# Patient Record
Sex: FEMALE | Race: WHITE | Employment: STUDENT | URBAN - METROPOLITAN AREA
[De-identification: names, ages, dates, MRNs, and addresses within clinical notes are randomized per-mention and may not be internally consistent; named-entity substitution may affect disease eponyms.]

---
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
## 2018-01-17 ENCOUNTER — MYC MEDICAL ADVICE (OUTPATIENT)
Dept: FAMILY MEDICINE | Facility: CLINIC | Age: 18
End: 2018-01-17

## 2019-01-30 ENCOUNTER — MYC MEDICAL ADVICE (OUTPATIENT)
Dept: FAMILY MEDICINE | Facility: CLINIC | Age: 19
End: 2019-01-30

## 2019-01-30 DIAGNOSIS — Z11.3 SCREEN FOR STD (SEXUALLY TRANSMITTED DISEASE): Primary | ICD-10-CM

## 2019-02-01 DIAGNOSIS — Z11.3 SCREEN FOR STD (SEXUALLY TRANSMITTED DISEASE): ICD-10-CM

## 2019-02-01 PROCEDURE — 87389 HIV-1 AG W/HIV-1&-2 AB AG IA: CPT | Performed by: FAMILY MEDICINE

## 2019-02-01 PROCEDURE — 87491 CHLMYD TRACH DNA AMP PROBE: CPT | Performed by: FAMILY MEDICINE

## 2019-02-01 PROCEDURE — 87591 N.GONORRHOEAE DNA AMP PROB: CPT | Performed by: FAMILY MEDICINE

## 2019-02-01 PROCEDURE — 86780 TREPONEMA PALLIDUM: CPT | Performed by: FAMILY MEDICINE

## 2019-02-01 PROCEDURE — 36415 COLL VENOUS BLD VENIPUNCTURE: CPT | Performed by: FAMILY MEDICINE

## 2019-02-02 LAB — T PALLIDUM AB SER QL: NONREACTIVE

## 2019-02-03 ENCOUNTER — TELEPHONE (OUTPATIENT)
Dept: FAMILY MEDICINE | Facility: CLINIC | Age: 19
End: 2019-02-03

## 2019-02-03 LAB
C TRACH DNA SPEC QL NAA+PROBE: POSITIVE
N GONORRHOEA DNA SPEC QL NAA+PROBE: NEGATIVE
SPECIMEN SOURCE: ABNORMAL
SPECIMEN SOURCE: NORMAL

## 2019-02-04 ENCOUNTER — TELEPHONE (OUTPATIENT)
Dept: FAMILY MEDICINE | Facility: CLINIC | Age: 19
End: 2019-02-04

## 2019-02-04 ENCOUNTER — MYC MEDICAL ADVICE (OUTPATIENT)
Dept: FAMILY MEDICINE | Facility: CLINIC | Age: 19
End: 2019-02-04

## 2019-02-04 DIAGNOSIS — Z11.8 SPECIAL SCREENING EXAMINATION FOR CHLAMYDIAL DISEASE: Primary | ICD-10-CM

## 2019-02-04 DIAGNOSIS — A74.9 CHLAMYDIA: Primary | ICD-10-CM

## 2019-02-04 LAB — HIV 1+2 AB+HIV1 P24 AG SERPL QL IA: NONREACTIVE

## 2019-02-04 RX ORDER — AZITHROMYCIN 500 MG/1
1000 TABLET, FILM COATED ORAL DAILY
Qty: 2 TABLET | Refills: 0 | Status: SHIPPED | OUTPATIENT
Start: 2019-02-04 | End: 2019-02-05

## 2019-02-04 NOTE — TELEPHONE ENCOUNTER
Informed pt of positive Chlamydia results. Per Dr Carmichael I called in rx for Azithromycin 1 gram 1 dose. To [facility] on [address].  Pt will inform partner and abstain for 1 week  marti longoria ma

## 2019-02-04 NOTE — TELEPHONE ENCOUNTER
Phone call from patient     She was advised by urgent care on 2/3/19 that her chlamydia test was positive and an rx would be sent to the [facility] for her     Patient states that the pharmacy called urgent care back to discuss rx and they received someone who advised patient was NOT in clinic and there was no record of the STD testing     Patient advised that her primary care provided placed the orders as lab only - writer does see these results in chart     Prescription sent to patient's pharmacy based on TE 2/3/19 Dr. Jany Reardon, Registered Nurse   [facility]

## 2019-02-06 ENCOUNTER — TELEPHONE (OUTPATIENT)
Dept: FAMILY MEDICINE | Facility: CLINIC | Age: 19
End: 2019-02-06

## 2019-02-06 NOTE — TELEPHONE ENCOUNTER
----- Message from Lucia Cortes sent at 2/5/2019 11:38 AM CST -----  Regarding: [facility] Reportables  Positive for C. Trachomatis

## 2019-03-04 DIAGNOSIS — Z11.8 SPECIAL SCREENING EXAMINATION FOR CHLAMYDIAL DISEASE: ICD-10-CM

## 2019-03-04 PROCEDURE — 87491 CHLMYD TRACH DNA AMP PROBE: CPT | Performed by: FAMILY MEDICINE

## 2019-03-05 LAB
C TRACH DNA SPEC QL NAA+PROBE: NEGATIVE
SPECIMEN SOURCE: NORMAL

## 2019-08-16 ENCOUNTER — OFFICE VISIT (OUTPATIENT)
Dept: FAMILY MEDICINE | Facility: CLINIC | Age: 19
End: 2019-08-16
Payer: COMMERCIAL

## 2019-08-16 VITALS
HEART RATE: 52 BPM | TEMPERATURE: 96.9 F | WEIGHT: 135.8 LBS | SYSTOLIC BLOOD PRESSURE: 90 MMHG | RESPIRATION RATE: 16 BRPM | BODY MASS INDEX: 25.24 KG/M2 | DIASTOLIC BLOOD PRESSURE: 60 MMHG

## 2019-08-16 PROCEDURE — 99213 OFFICE O/P EST LOW 20 MIN: CPT | Performed by: FAMILY MEDICINE

## 2019-08-16 RX ORDER — NORGESTIMATE AND ETHINYL ESTRADIOL 7DAYSX3 28
1 KIT ORAL DAILY
Qty: 84 TABLET | Refills: 3 | Status: SHIPPED | OUTPATIENT
Start: 2019-08-16 | End: 2019-11-25 | Stop reason: SINTOL

## 2019-08-16 ASSESSMENT — PAIN SCALES - GENERAL: PAINLEVEL: NO PAIN (0)

## 2019-08-16 NOTE — PROGRESS NOTES
Subjective     Blanca Andrew is a 18 year old female who presents to clinic today for the following health issues:    HPI     Chief Complaint   Patient presents with     Contraception     Needs to discuss birth control options. She is sexually active and using condoms for contraception currently. Took Sprintec in the past and did not like it. Had heavy painful periods while taking it. Periods without birth control are light and very manageable for her. Wondering about options      Reviewed and updated as needed this visit by Provider  Tobacco  Meds  Med Hx  Surg Hx  Fam Hx  Soc Hx        Review of Systems   ROS COMP: Constitutional, HEENT, cardiovascular, pulmonary, gi and gu systems are negative, except as otherwise noted.      Objective    BP 90/60 (BP Location: Right arm, Patient Position: Sitting, Cuff Size: Adult Regular)   Pulse 52   Temp 96.9  F (36.1  C) (Tympanic)   Resp 16   Wt 61.6 kg (135 lb 12.8 oz)   LMP 07/27/2019   BMI 25.24 kg/m    Body mass index is 25.24 kg/m .  Physical Exam   PE:  VS as above   Gen:  WN/WD/WH female in NAD  Remainder of exam deferred    Diagnostic Test Results:  Labs reviewed in Epic        A/P:      ICD-10-CM    1. Contraception Z78.9 norgestim-eth estrad triphasic (ORTHO TRI-CYCLEN/TRI-SPRINTEC) 0.18/0.215/0.25 MG-35 MCG tablet     Reviewed variety of contraception options including LTRC such as Nexplanon and IUD.  Also reviewed OCP including risk of blood clot.    Pt opted for OCP as prescribed.    Reviewed when to start med Sunday after the firs day of her menstrual period.  Reviewed need for condoms for at least the first month of use and ongoing for STD prevention.  Reviewed side effects, risks and reasons to seek care.    15 minutes of 15 min visit spent reviewing various options as above.

## 2019-11-21 ENCOUNTER — MYC MEDICAL ADVICE (OUTPATIENT)
Dept: FAMILY MEDICINE | Facility: CLINIC | Age: 19
End: 2019-11-21

## 2019-11-21 DIAGNOSIS — Z30.011 ENCOUNTER FOR INITIAL PRESCRIPTION OF CONTRACEPTIVE PILLS: Primary | ICD-10-CM

## 2019-11-26 ENCOUNTER — MYC MEDICAL ADVICE (OUTPATIENT)
Dept: FAMILY MEDICINE | Facility: CLINIC | Age: 19
End: 2019-11-26

## 2019-11-26 RX ORDER — NORETHINDRONE ACETATE AND ETHINYL ESTRADIOL .02; 1 MG/1; MG/1
1 TABLET ORAL DAILY
Qty: 84 TABLET | Refills: 3 | Status: SHIPPED | OUTPATIENT
Start: 2019-11-26 | End: 2020-02-18

## 2019-11-29 ENCOUNTER — MYC MEDICAL ADVICE (OUTPATIENT)
Dept: FAMILY MEDICINE | Facility: CLINIC | Age: 19
End: 2019-11-29

## 2020-01-21 ENCOUNTER — OFFICE VISIT (OUTPATIENT)
Dept: FAMILY MEDICINE | Facility: CLINIC | Age: 20
End: 2020-01-21
Payer: COMMERCIAL

## 2020-01-21 VITALS
SYSTOLIC BLOOD PRESSURE: 120 MMHG | DIASTOLIC BLOOD PRESSURE: 70 MMHG | HEIGHT: 62 IN | BODY MASS INDEX: 25.43 KG/M2 | TEMPERATURE: 97.3 F | WEIGHT: 138.2 LBS | HEART RATE: 64 BPM | RESPIRATION RATE: 12 BRPM

## 2020-01-21 DIAGNOSIS — F33.1 MODERATE EPISODE OF RECURRENT MAJOR DEPRESSIVE DISORDER (H): Primary | ICD-10-CM

## 2020-01-21 PROCEDURE — 99213 OFFICE O/P EST LOW 20 MIN: CPT | Performed by: FAMILY MEDICINE

## 2020-01-21 RX ORDER — ESCITALOPRAM OXALATE 10 MG/1
10 TABLET ORAL DAILY
Qty: 30 TABLET | Refills: 1 | Status: SHIPPED | OUTPATIENT
Start: 2020-01-21 | End: 2020-02-18

## 2020-01-21 ASSESSMENT — ANXIETY QUESTIONNAIRES
2. NOT BEING ABLE TO STOP OR CONTROL WORRYING: MORE THAN HALF THE DAYS
1. FEELING NERVOUS, ANXIOUS, OR ON EDGE: SEVERAL DAYS
5. BEING SO RESTLESS THAT IT IS HARD TO SIT STILL: NOT AT ALL
3. WORRYING TOO MUCH ABOUT DIFFERENT THINGS: MORE THAN HALF THE DAYS
GAD7 TOTAL SCORE: 11
6. BECOMING EASILY ANNOYED OR IRRITABLE: NEARLY EVERY DAY
7. FEELING AFRAID AS IF SOMETHING AWFUL MIGHT HAPPEN: MORE THAN HALF THE DAYS

## 2020-01-21 ASSESSMENT — PATIENT HEALTH QUESTIONNAIRE - PHQ9
5. POOR APPETITE OR OVEREATING: SEVERAL DAYS
SUM OF ALL RESPONSES TO PHQ QUESTIONS 1-9: 16

## 2020-01-21 ASSESSMENT — MIFFLIN-ST. JEOR: SCORE: 1349.09

## 2020-01-21 NOTE — PROGRESS NOTES
Subjective     Blanca Andrew is a 19 year old female who presents to clinic today for the following health issues: Return of depression Sx.     HPI   Blanca Andrew presents to clinic today with complaints of return of depression symptoms for last 3-4 months. She states that she was dx with depression 2 years ago while attending [employer] in [place]. Was seen by counseling services on campus, and prescribed Sertraline and Trazodone at that time. Comments that counseling services were helpful, but stopped meds after 3 months due to GI upset and lack of efficacy.  Continued counseling x1 year until feeling well before stopping.     Blanca, graduated from [employer] in December 2019, and states that her parents  5 months ago, she states that both of these are big stressors in her life and likely are contributing to her return of depression symptoms. She comments that her feelings of depression began coinciding with these stressors and have gradually worsened over this time.     She denies SI, endorses having intrusive thoughts on two occassions while driving her car but no desire to act upon, and states that she has no plans to hurt herself or others. She states she feels close to her parents and can talk to either her mom, dad, or boyfriend if her symptoms/thoughts were to worsen. Is currently taking pre-med classes at local [employer] and works as a scribe at [employer]. States she wants to enjoy activities and being with friends but wakes up in the morning with feelings of being overwhelmed, sadness, and little enjoyment for the day.  Cannot enjoy activities at this time despite wanting too. States she recently start counseling services again, has had one session to date and has another scheduled today.       Abnormal Mood Symptoms  Onset: 2 months    Description:   Depression: YES  Anxiety: YES    Accompanying Signs & Symptoms:  Still participating in activities that you used to enjoy:  "no  Fatigue: YES  Irritability: YES  Difficulty concentrating: no  Changes in appetite: YES  Problems with sleep: YES  Heart racing/beating fast : no  Thoughts of hurting yourself or others: none    History:   Recent stress: YES- graduated college 3 weeks ago, living at home with parents, working a lot, looking for new job, considering moving in with boyfriend, applying to med school  Prior depression hospitalization: None  Family history of depression: no  Family history of anxiety: YES- mother    Precipitating factors:   Alcohol/drug use: YES- very occasional alcohol    Alleviating factors:  none    Review of Systems   ROS COMP: Constitutional, HEENT, cardiovascular, pulmonary, gi and gu systems are negative, except as otherwise noted.      Objective    /70   Pulse 64   Temp 97.3  F (36.3  C) (Tympanic)   Resp 12   Ht 1.581 m (5' 2.25\")   Wt 62.7 kg (138 lb 3.2 oz)   LMP 01/07/2020   Breastfeeding No   BMI 25.07 kg/m    Body mass index is 25.07 kg/m .     Physical Exam   PE:  VS as above   Gen:  WN/WD/WH female in NAD   Psych: Alert and oriented times 3; coherent speech, normal   rate and volume, able to articulate logical thoughts, able   to abstract reason, no tangential thoughts, no hallucinations   or delusions  Her affect is depressed and tearful.  Good eye contact noted      Diagnostic Test Results:  none         Assessment & Plan   Assessment    ICD-10-CM    1. Moderate episode of recurrent major depressive disorder (H) F33.1 escitalopram (LEXAPRO) 10 MG tablet     -Symptoms are consistent with clinical depression, given hx and recent stressors, appropriate to begin therapy again.     -Blanca was provided education on all side effects of serotonin specific reuptake inhibitor's, including: GI, sexual side effects, and possibility of increase in SI and recommended to call clinic immediately if symptoms are noticed.       Plan  -Start Escitalopram 10 mg daily.  -Continue counseling services. "   -Patient plans to return in about 4 weeks (around 2/18/2020) for Physical Exam and follow up on medication efficacy.     Patient Instructions   So glad to hear that you have already gotten started with therapy!    We'll begin a medicine today too, lexapro.  This is taken just once daily.  Please let me know if you have any concerning side effects.  You can reach out through [employer] if needed.    We should visit again in 4-6 weeks to see how things are going.  We can do that at the same time as your physical.        Evangelina Alvarado, DO  [facility]

## 2020-01-21 NOTE — NURSING NOTE
"Initial /70   Pulse 64   Temp 97.3  F (36.3  C) (Tympanic)   Resp 12   Ht 1.581 m (5' 2.25\")   Wt 62.7 kg (138 lb 3.2 oz)   LMP 01/07/2020   Breastfeeding No   BMI 25.07 kg/m   Estimated body mass index is 25.07 kg/m  as calculated from the following:    Height as of this encounter: 1.581 m (5' 2.25\").    Weight as of this encounter: 62.7 kg (138 lb 3.2 oz). .      "

## 2020-01-21 NOTE — PATIENT INSTRUCTIONS
So glad to hear that you have already gotten started with therapy!    We'll begin a medicine today too, lexapro.  This is taken just once daily.  Please let me know if you have any concerning side effects.  You can reach out through [employer] if needed.    We should visit again in 4-6 weeks to see how things are going.  We can do that at the same time as your physical.

## 2020-01-21 NOTE — LETTER
My Depression Action Plan  Name: Blanca Andrew   Date of Birth 2000  Date: 1/21/2020    My doctor: Evangelina Alvarado   My clinic: [address] [id number] [phone]          GREEN    ZONE   Good Control    What it looks like:     Things are going generally well. You have normal ups and downs. You may even feel depressed from time to time, but bad moods usually last less than a day.   What you need to do:  1. Continue to care for yourself (see self care plan)  2. Check your depression survival kit and update it as needed  3. Follow your physician s recommendations including any medication.  4. Do not stop taking medication unless you consult with your physician first.           YELLOW         ZONE Getting Worse    What it looks like:     Depression is starting to interfere with your life.     It may be hard to get out of bed; you may be starting to isolate yourself from others.    Symptoms of depression are starting to last most all day and this has happened for several days.     You may have suicidal thoughts but they are not constant.   What you need to do:     1. Call your care team. Your response to treatment will improve if you keep your care team informed of your progress. Yellow periods are signs an adjustment may need to be made.     2. Continue your self-care.  Just get dressed and ready for the day.  Don't give yourself time to talk yourself out of it.    3. Talk to someone in your support network.    4. Open up your Depression Self-Care Plan/Wellness Kit.           RED    ZONE Medical Alert - Get Help    What it looks like:     Depression is seriously interfering with your life.     You may experience these or other symptoms: You can t get out of bed most days, can t work or engage in other necessary activities, you have trouble taking care of basic hygiene, or basic responsibilities, thoughts of suicide or death that will not go away,  self-injurious behavior.     What you need to do:  1. Call your care team and request a same-day appointment. If they are not available (weekends or after hours) call your local crisis line, emergency room or 911.            Depression Self-Care Plan / Wellness Kit    Self-Care for Depression  Here s the deal. Your body and mind are really not as separate as most people think.  What you do and think affects how you feel and how you feel influences what you do and think. This means if you do things that people who feel good do, it will help you feel better.  Sometimes this is all it takes.  There is also a place for medication and therapy depending on how severe your depression is, so be sure to consult with your medical provider and/ or Behavioral Health Consultant if your symptoms are worsening or not improving.     In order to better manage my stress, I will:    Exercise  Get some form of exercise, every day. This will help reduce pain and release endorphins, the  feel good  chemicals in your brain. This is almost as good as taking antidepressants!  This is not the same as joining a gym and then never going! (they count on that by the way ) It can be as simple as just going for a walk or doing some gardening, anything that will get you moving.      Hygiene   Maintain good hygiene (get out of bed in the morning, make your bed, brush your teeth, take a shower, and get dressed like you were going to work, even if you are unemployed).  If your clothes don't fit try to get ones that do.    Diet  Strive to eat foods that are good for me, drink plenty of water, and avoid excessive sugar, caffeine, alcohol, and other mood-altering substances.  Some foods that are helpful in depression are: complex carbohydrates, B vitamins, flaxseed, fish or fish oil, fresh fruits and vegetables.    Psychotherapy  Agree to participate in Individual Therapy (if recommended).    Medication  If prescribed medications, I agree to take them.   Missing doses can result in serious side effects.  I understand that drinking alcohol, or other illicit drug use, may cause potential side effects.  I will not stop my medication abruptly without first discussing it with my provider.    Staying Connected With Others  Stay in touch with my friends, family members, and my primary care provider/team.    Use your imagination  Be creative.  We all have a creative side; it doesn t matter if it s oil painting, sand castles, or mud pies! This will also kick up the endorphins.    Witness Beauty  (AKA stop and smell the roses) Take a look outside, even in mid-winter. Notice colors, textures. Watch the squirrels and birds.     Service to others  Be of service to others.  There is always someone else in need.  By helping others we can  get out of ourselves  and remember the really important things.  This also provides opportunities for practicing all the other parts of the program.    Humor  Laugh and be silly!  Adjust your TV habits for less news and crime-drama and more comedy.    Control your stress  Try breathing deep, massage therapy, biofeedback, and meditation. Find time to relax each day.     Crisis Text Line  http://www.crisistextline.org    The Crisis Text Line serves anyone, in any type of crisis, providing access to free, 24/7 support and information via the medium people already use and trust:    Here's how it works:  1.  Text 348-383 from anywhere in the [place], anytime, about any type of crisis.  2.  A live, trained Crisis Counselor receives the text and responds quickly.  3.  The volunteer Crisis Counselor will help you move from a 'hot moment to a cool moment'.    My support system    Clinic Contact:  Phone number:    Contact 1:  Phone number:    Contact 2:  Phone number:    P[employer]/:  Phone number:    Therapist:  Phone number:    Local crisis center:    Phone number:    Other community support:  Phone number:

## 2020-01-22 ASSESSMENT — ANXIETY QUESTIONNAIRES: GAD7 TOTAL SCORE: 11

## 2020-02-15 ENCOUNTER — MYC REFILL (OUTPATIENT)
Dept: FAMILY MEDICINE | Facility: CLINIC | Age: 20
End: 2020-02-15

## 2020-02-15 DIAGNOSIS — Z30.011 ENCOUNTER FOR INITIAL PRESCRIPTION OF CONTRACEPTIVE PILLS: ICD-10-CM

## 2020-02-15 DIAGNOSIS — F33.1 MODERATE EPISODE OF RECURRENT MAJOR DEPRESSIVE DISORDER (H): ICD-10-CM

## 2020-02-15 RX ORDER — ESCITALOPRAM OXALATE 10 MG/1
10 TABLET ORAL DAILY
Qty: 30 TABLET | Refills: 1 | Status: CANCELLED | OUTPATIENT
Start: 2020-02-15

## 2020-02-15 RX ORDER — NORETHINDRONE ACETATE AND ETHINYL ESTRADIOL .02; 1 MG/1; MG/1
1 TABLET ORAL DAILY
Qty: 84 TABLET | Refills: 3 | Status: CANCELLED | OUTPATIENT
Start: 2020-02-15

## 2020-02-16 ENCOUNTER — HEALTH MAINTENANCE LETTER (OUTPATIENT)
Age: 20
End: 2020-02-16

## 2020-02-18 ENCOUNTER — MYC REFILL (OUTPATIENT)
Dept: FAMILY MEDICINE | Facility: CLINIC | Age: 20
End: 2020-02-18

## 2020-02-18 ENCOUNTER — MYC MEDICAL ADVICE (OUTPATIENT)
Dept: FAMILY MEDICINE | Facility: CLINIC | Age: 20
End: 2020-02-18

## 2020-02-18 DIAGNOSIS — F33.1 MODERATE EPISODE OF RECURRENT MAJOR DEPRESSIVE DISORDER (H): ICD-10-CM

## 2020-02-18 DIAGNOSIS — Z30.011 ENCOUNTER FOR INITIAL PRESCRIPTION OF CONTRACEPTIVE PILLS: ICD-10-CM

## 2020-02-18 RX ORDER — NORETHINDRONE ACETATE AND ETHINYL ESTRADIOL .02; 1 MG/1; MG/1
1 TABLET ORAL DAILY
Qty: 84 TABLET | Refills: 0 | Status: SHIPPED | OUTPATIENT
Start: 2020-02-18 | End: 2020-02-25

## 2020-02-18 RX ORDER — ESCITALOPRAM OXALATE 10 MG/1
10 TABLET ORAL DAILY
Qty: 30 TABLET | Refills: 0 | Status: SHIPPED | OUTPATIENT
Start: 2020-02-18 | End: 2020-04-24

## 2020-02-18 NOTE — TELEPHONE ENCOUNTER
Prescription approved per [facility] [place] Refill Protocol.  Patient needs follow up for depression, has appointment scheduled 2/25/20.  Cherry Paula RN

## 2020-02-24 ASSESSMENT — ENCOUNTER SYMPTOMS
MYALGIAS: 0
HEADACHES: 1
DYSURIA: 0
FREQUENCY: 0
HEMATOCHEZIA: 0
ABDOMINAL PAIN: 0
FEVER: 0
CHILLS: 0
PALPITATIONS: 0
WEAKNESS: 0
HEARTBURN: 0
DIZZINESS: 0
NAUSEA: 0
SORE THROAT: 0
EYE PAIN: 0
COUGH: 0
JOINT SWELLING: 0
ARTHRALGIAS: 0
CONSTIPATION: 0
HEMATURIA: 0
PARESTHESIAS: 0
BREAST MASS: 0
SHORTNESS OF BREATH: 0
DIARRHEA: 0
NERVOUS/ANXIOUS: 1

## 2020-02-25 ENCOUNTER — OFFICE VISIT (OUTPATIENT)
Dept: FAMILY MEDICINE | Facility: CLINIC | Age: 20
End: 2020-02-25
Payer: COMMERCIAL

## 2020-02-25 VITALS
DIASTOLIC BLOOD PRESSURE: 76 MMHG | SYSTOLIC BLOOD PRESSURE: 124 MMHG | RESPIRATION RATE: 12 BRPM | HEART RATE: 60 BPM | TEMPERATURE: 97 F | BODY MASS INDEX: 25.58 KG/M2 | HEIGHT: 62 IN | WEIGHT: 139 LBS

## 2020-02-25 DIAGNOSIS — F33.1 MODERATE EPISODE OF RECURRENT MAJOR DEPRESSIVE DISORDER (H): ICD-10-CM

## 2020-02-25 DIAGNOSIS — Z12.4 SCREENING FOR CERVICAL CANCER: ICD-10-CM

## 2020-02-25 DIAGNOSIS — Z00.00 ROUTINE GENERAL MEDICAL EXAMINATION AT A HEALTH CARE FACILITY: Primary | ICD-10-CM

## 2020-02-25 DIAGNOSIS — Z11.3 SCREEN FOR STD (SEXUALLY TRANSMITTED DISEASE): ICD-10-CM

## 2020-02-25 DIAGNOSIS — Z30.011 ENCOUNTER FOR INITIAL PRESCRIPTION OF CONTRACEPTIVE PILLS: ICD-10-CM

## 2020-02-25 PROCEDURE — 87591 N.GONORRHOEAE DNA AMP PROB: CPT | Performed by: FAMILY MEDICINE

## 2020-02-25 PROCEDURE — G0145 SCR C/V CYTO,THINLAYER,RESCR: HCPCS | Performed by: FAMILY MEDICINE

## 2020-02-25 PROCEDURE — 87491 CHLMYD TRACH DNA AMP PROBE: CPT | Performed by: FAMILY MEDICINE

## 2020-02-25 PROCEDURE — 99395 PREV VISIT EST AGE 18-39: CPT | Performed by: FAMILY MEDICINE

## 2020-02-25 PROCEDURE — 99213 OFFICE O/P EST LOW 20 MIN: CPT | Mod: 25 | Performed by: FAMILY MEDICINE

## 2020-02-25 RX ORDER — ESCITALOPRAM OXALATE 10 MG/1
10 TABLET ORAL DAILY
Qty: 30 TABLET | Refills: 0 | Status: CANCELLED | OUTPATIENT
Start: 2020-02-25

## 2020-02-25 RX ORDER — ESCITALOPRAM OXALATE 20 MG/1
20 TABLET ORAL DAILY
Qty: 30 TABLET | Refills: 0 | Status: SHIPPED | OUTPATIENT
Start: 2020-02-25 | End: 2020-04-24

## 2020-02-25 RX ORDER — NORETHINDRONE ACETATE AND ETHINYL ESTRADIOL .02; 1 MG/1; MG/1
1 TABLET ORAL DAILY
Qty: 84 TABLET | Refills: 3 | Status: SHIPPED | OUTPATIENT
Start: 2020-02-25 | End: 2020-04-24

## 2020-02-25 ASSESSMENT — ENCOUNTER SYMPTOMS
HEARTBURN: 0
EYE PAIN: 0
NERVOUS/ANXIOUS: 1
PARESTHESIAS: 0
DYSURIA: 0
BREAST MASS: 0
CONSTIPATION: 0
HEMATOCHEZIA: 0
COUGH: 0
PALPITATIONS: 0
NAUSEA: 0
WEAKNESS: 0
FEVER: 0
HEADACHES: 1
DIARRHEA: 0
JOINT SWELLING: 0
SORE THROAT: 0
ABDOMINAL PAIN: 0
CHILLS: 0
ARTHRALGIAS: 0
DIZZINESS: 0
MYALGIAS: 0
HEMATURIA: 0
FREQUENCY: 0
SHORTNESS OF BREATH: 0

## 2020-02-25 ASSESSMENT — PATIENT HEALTH QUESTIONNAIRE - PHQ9
SUM OF ALL RESPONSES TO PHQ QUESTIONS 1-9: 2
5. POOR APPETITE OR OVEREATING: MORE THAN HALF THE DAYS

## 2020-02-25 ASSESSMENT — ANXIETY QUESTIONNAIRES
6. BECOMING EASILY ANNOYED OR IRRITABLE: NOT AT ALL
3. WORRYING TOO MUCH ABOUT DIFFERENT THINGS: NEARLY EVERY DAY
7. FEELING AFRAID AS IF SOMETHING AWFUL MIGHT HAPPEN: SEVERAL DAYS
GAD7 TOTAL SCORE: 11
1. FEELING NERVOUS, ANXIOUS, OR ON EDGE: MORE THAN HALF THE DAYS
5. BEING SO RESTLESS THAT IT IS HARD TO SIT STILL: MORE THAN HALF THE DAYS
2. NOT BEING ABLE TO STOP OR CONTROL WORRYING: SEVERAL DAYS

## 2020-02-25 ASSESSMENT — MIFFLIN-ST. JEOR: SCORE: 1352.72

## 2020-02-25 NOTE — PROGRESS NOTES
SUBJECTIVE:   CC: Blanca Andrew is an 19 year old woman who presents for preventive health visit.     Healthy Habits:     Getting at least 3 servings of Calcium per day:  Yes    Bi-annual eye exam:  NO    Dental care twice a year:  Yes    Sleep apnea or symptoms of sleep apnea:  None    Diet:  Regular (no restrictions)    Frequency of exercise:  4-5 days/week    Duration of exercise:  Greater than 60 minutes    Taking medications regularly:  Yes    Medication side effects:  None    PHQ-2 Total Score: 1    Additional concerns today:  Yes    Concerns:  * follow up on depression    Feels mood is better but might be more anxious.  No side effect related to medication noted.  Feels she is noticing her anxiety more now that she is less depressed.    * birth control side effects  Was spotting 2 weeks into her pack the first 2 months.  No spotting this month.  Other then spotting tolerating med well.    Went to  her birth control and was charged $70.      * feels lump in throat    Feels like it has been present for about 1 month.   When she swallows she feels something on the L side of her throat.  Able to swallow normally.  Was ill end of Jan when symptoms started.  Other symptims of illness resolved but sensationw iht swallowing remains.      Last week woke with her worst migraine.  Very light sensitive, had chills.  Vomited as well.  Was seen a a clinic she thought was an UC.  Given toradol and zofran and resolved over the rest of the day.  Has not had a recurrence    Today's PHQ-2 Score:   PHQ-2 ( 1999 Pf[employer]) 2/24/2020   Q1: Little interest or pleasure in doing things 0   Q2: Feeling down, depressed or hopeless 1   PHQ-2 Score 1   Q1: Little interest or pleasure in doing things Not at all   Q2: Feeling down, depressed or hopeless Several days   PHQ-2 Score 1       Abuse: Current or Past(Physical, Sexual or Emotional)- No  Do you feel safe in your environment? Yes        Social History     Tobacco Use      Smoking status: Never Smoker     Smokeless tobacco: Never Used   Substance Use Topics     Alcohol use: No     Alcohol/week: 0.0 standard drinks     If you drink alcohol do you typically have >3 drinks per day or >7 drinks per week? No    Alcohol Use 2/25/2020   Prescreen: >3 drinks/day or >7 drinks/week? -   Prescreen: >3 drinks/day or >7 drinks/week? No       Reviewed orders with patient.  Reviewed health maintenance and updated orders accordingly - Yes    Mammogram not appropriate for this patient based on age.    Pertinent mammograms are reviewed under the imaging tab.  History of abnormal Pap smear: NO - age 21-29 PAP every 3 years recommended  PAP / HPV 2/25/2020   PAP NIL     Reviewed and updated as needed this visit by clinical staff  Tobacco  Allergies  Meds  Med Hx  Surg Hx  Fam Hx  Soc Hx        Reviewed and updated as needed this visit by Provider  Tobacco  Allergies  Meds  Med Hx  Surg Hx  Fam Hx  Soc Hx       History reviewed. No pertinent past medical history.   Past Surgical History:   Procedure Laterality Date     NO HISTORY OF SURGERY         Review of Systems   Constitutional: Negative for chills and fever.   HENT: Negative for congestion, ear pain, hearing loss and sore throat.    Eyes: Negative for pain and visual disturbance.   Respiratory: Negative for cough and shortness of breath.    Cardiovascular: Negative for chest pain, palpitations and peripheral edema.   Gastrointestinal: Negative for abdominal pain, constipation, diarrhea, heartburn, hematochezia and nausea.   Breasts:  Positive for tenderness. Negative for breast mass and discharge.   Genitourinary: Positive for vaginal bleeding. Negative for dysuria, frequency, genital sores, hematuria, pelvic pain, urgency and vaginal discharge.   Musculoskeletal: Negative for arthralgias, joint swelling and myalgias.   Skin: Negative for rash.   Neurological: Positive for headaches. Negative for dizziness, weakness and paresthesias.  "  Psychiatric/Behavioral: Negative for mood changes. The patient is nervous/anxious.           OBJECTIVE:   /76   Pulse 60   Temp 97  F (36.1  C) (Tympanic)   Resp 12   Ht 1.581 m (5' 2.25\")   Wt 63 kg (139 lb)   LMP 01/29/2020   Breastfeeding No   BMI 25.22 kg/m    Physical Exam  GENERAL: healthy, alert and no distress  EYES: Eyes grossly normal to inspection, PERRL and conjunctivae and sclerae normal  HENT: ear canals and TM's normal, nose and mouth without ulcers or lesions  NECK: no adenopathy, no asymmetry, masses, or scars and thyroid normal to palpation  RESP: lungs clear to auscultation - no rales, rhonchi or wheezes  BREAST: normal without masses, tenderness or nipple discharge and no palpable axillary masses or adenopathy  CV: regular rate and rhythm, normal S1 S2, no S3 or S4, no murmur, click or rub, no peripheral edema and peripheral pulses strong  ABDOMEN: soft, nontender, no hepatosplenomegaly, no masses and bowel sounds normal   (female): normal female external genitalia, normal urethral meatus, vaginal mucosa pink, moist, well rugated, and normal cervix/adnexa/uterus without masses or discharge  MS: no gross musculoskeletal defects noted, no edema  NEURO: Normal strength and tone, mentation intact and speech normal  PSYCH: mentation appears normal, affect normal/bright    Diagnostic Test Results:  Labs reviewed in Epic    ASSESSMENT/PLAN:       ICD-10-CM    1. Routine general medical examination at a health care facility Z00.00    2. Moderate episode of recurrent major depressive disorder (H) F33.1    3. Encounter for initial prescription of contraceptive pills Z30.011 norethindrone-ethinyl estradiol (MICROGESTIN 1/20) 1-20 MG-MCG tablet     escitalopram (LEXAPRO) 20 MG tablet   4. Screening for cervical cancer Z12.4 Pap imaged thin layer screen only - recommended age 21 - 24 years   5. Screen for STD (sexually transmitted disease) Z11.3 NEISSERIA GONORRHOEA PCR     CHLAMYDIA " "TRACHOMATIS PCR      We'll try increasing the lexapro to 20mg daily.  You can take 2 of your 10mg tablets for now.  I sent a new prescription for the 20mg that you can start when you run out.  Let's touch base again in 4-6 weeks to see how things are going    Let me know if you continue to have spotting on the pill.    Also let me know if the throat symptoms don't resolve over the next 2-3 weeks.    COUNSELING:  Reviewed preventive health counseling, as reflected in patient instructions  Special attention given to:        Regular exercise       Healthy diet/nutrition       Contraception       Family planning       Osteoporosis Prevention/Bone Health       Safe sex practices/STD prevention    Estimated body mass index is 25.22 kg/m  as calculated from the following:    Height as of this encounter: 1.581 m (5' 2.25\").    Weight as of this encounter: 63 kg (139 lb).         reports that she has never smoked. She has never used smokeless tobacco.      Counseling Resources:  ATP IV Guidelines  Pooled Cohorts Equation Calculator  Breast Cancer Risk Calculator  FRAX Risk Assessment  ICSI Preventive Guidelines  Dietary Guidelines for Americans, 2010  [employer] My[employer]  ASA Prophylaxis  Lung CA Screening    Evangelina Alvarado, DO  [facility]    Patient Instructions    We'll try increasing the lexapro to 20mg daily.  You can take 2 of your 10mg tablets for now.  I sent a new prescription for the 20mg that you can start when you run out.  Let's touch base again in 4-6 weeks to see how things are going    Let me know if you continue to have spotting on the pill.    Also let me know if the throat symptoms don't resolve over the next 2-3 weeks.    Preventive Health Recommendations  Female Ages 21 to 25     Yearly exam:     See your health care provider every year in order to  o Review health changes.   o Discuss preventive care.    o Review your medicines if your doctor has prescribed any.      You should be tested " each year for STDs (sexually transmitted diseases).       Talk to your provider about how often you should have cholesterol testing.      Get a Pap test every three years. If you have an abnormal result, your doctor may have you test more often.      If you are at risk for diabetes, you should have a diabetes test (fasting glucose).     Shots:     Get a flu shot each year.     Get a tetanus shot every 10 years.     Consider getting the shot (vaccine) that prevents cervical cancer (Gardasil).    Nutrition:     Eat at least 5 servings of fruits and vegetables each day.    Eat whole-grain bread, whole-wheat pasta and brown rice instead of white grains and rice.    Get adequate Calcium and Vitamin D.     Lifestyle    Exercise at least 150 minutes a week each week (30 minutes a day, 5 days a week). This will help you control your weight and prevent disease.    Limit alcohol to one drink per day.    No smoking.     Wear sunscreen to prevent skin cancer.    See your dentist every six months for an exam and cleaning.

## 2020-02-25 NOTE — PATIENT INSTRUCTIONS
We'll try increasing the lexapro to 20mg daily.  You can take 2 of your 10mg tablets for now.  I sent a new prescription for the 20mg that you can start when you run out.  Let's touch base again in 4-6 weeks to see how things are going    Let me know if you continue to have spotting on the pill.    Also let me know if the throat symptoms don't resolve over the next 2-3 weeks.    Preventive Health Recommendations  Female Ages 21 to 25     Yearly exam:     See your health care provider every year in order to  o Review health changes.   o Discuss preventive care.    o Review your medicines if your doctor has prescribed any.      You should be tested each year for STDs (sexually transmitted diseases).       Talk to your provider about how often you should have cholesterol testing.      Get a Pap test every three years. If you have an abnormal result, your doctor may have you test more often.      If you are at risk for diabetes, you should have a diabetes test (fasting glucose).     Shots:     Get a flu shot each year.     Get a tetanus shot every 10 years.     Consider getting the shot (vaccine) that prevents cervical cancer (Gardasil).    Nutrition:     Eat at least 5 servings of fruits and vegetables each day.    Eat whole-grain bread, whole-wheat pasta and brown rice instead of white grains and rice.    Get adequate Calcium and Vitamin D.     Lifestyle    Exercise at least 150 minutes a week each week (30 minutes a day, 5 days a week). This will help you control your weight and prevent disease.    Limit alcohol to one drink per day.    No smoking.     Wear sunscreen to prevent skin cancer.    See your dentist every six months for an exam and cleaning.

## 2020-02-25 NOTE — NURSING NOTE
"Initial /76   Pulse 60   Temp 97  F (36.1  C) (Tympanic)   Resp 12   Ht 1.581 m (5' 2.25\")   Wt 63 kg (139 lb)   LMP 01/29/2020   Breastfeeding No   BMI 25.22 kg/m   Estimated body mass index is 25.22 kg/m  as calculated from the following:    Height as of this encounter: 1.581 m (5' 2.25\").    Weight as of this encounter: 63 kg (139 lb). .      "

## 2020-02-26 LAB
C TRACH DNA SPEC QL NAA+PROBE: NEGATIVE
N GONORRHOEA DNA SPEC QL NAA+PROBE: NEGATIVE
SPECIMEN SOURCE: NORMAL
SPECIMEN SOURCE: NORMAL

## 2020-02-26 ASSESSMENT — ANXIETY QUESTIONNAIRES: GAD7 TOTAL SCORE: 11

## 2020-02-27 LAB
COPATH REPORT: NORMAL
PAP: NORMAL

## 2020-03-31 ENCOUNTER — MYC MEDICAL ADVICE (OUTPATIENT)
Dept: FAMILY MEDICINE | Facility: CLINIC | Age: 20
End: 2020-03-31

## 2020-04-22 ENCOUNTER — E-VISIT (OUTPATIENT)
Dept: FAMILY MEDICINE | Facility: CLINIC | Age: 20
End: 2020-04-22
Payer: COMMERCIAL

## 2020-04-22 DIAGNOSIS — R68.84 JAW PAIN: Primary | ICD-10-CM

## 2020-04-22 PROCEDURE — 99207 ZZC NON-BILLABLE SERV PER CHARTING: CPT | Performed by: FAMILY MEDICINE

## 2020-04-24 ENCOUNTER — VIRTUAL VISIT (OUTPATIENT)
Dept: FAMILY MEDICINE | Facility: CLINIC | Age: 20
End: 2020-04-24
Payer: COMMERCIAL

## 2020-04-24 DIAGNOSIS — M26.609 TMJ (TEMPOROMANDIBULAR JOINT SYNDROME): Primary | ICD-10-CM

## 2020-04-24 DIAGNOSIS — Z30.09 ENCOUNTER FOR OTHER GENERAL COUNSELING OR ADVICE ON CONTRACEPTION: ICD-10-CM

## 2020-04-24 PROCEDURE — 99213 OFFICE O/P EST LOW 20 MIN: CPT | Mod: 95 | Performed by: FAMILY MEDICINE

## 2020-04-24 RX ORDER — NAPROXEN 500 MG/1
500 TABLET ORAL 2 TIMES DAILY WITH MEALS
Qty: 30 TABLET | Refills: 0 | Status: SHIPPED | OUTPATIENT
Start: 2020-04-24 | End: 2020-12-28

## 2020-04-24 RX ORDER — LANOLIN ALCOHOL/MO/W.PET/CERES
1000 CREAM (GRAM) TOPICAL DAILY
COMMUNITY

## 2020-04-24 RX ORDER — VITAMIN B COMPLEX
50 TABLET ORAL DAILY
COMMUNITY

## 2020-04-24 NOTE — PATIENT INSTRUCTIONS
For the jaw:  Please stop the ibuprofen.  I sent a prescription for naprosyn to try in its place.  This is taken just twice daily with food.  You can continue ice and heat as well.  Please also continue to avoid opening the mouth too wide as well as chewy or crunchy foods.  Try to stick with quite soft foods for now.    Please call to schedule physical therapy when you are able.  You can reach them at [phone].   I think this will help quite a bit.  You can also call your dentist to see if they have someone they refer pt's too for TMJ.  Sometimes the dental providers can help with mouth guards and the like.    Let me know if things are not improving    For your birth control:  Just give us a call when you are ready to schedule and we can get you all set.

## 2020-04-24 NOTE — PROGRESS NOTES
"Blanca Andrew is a 19 year old female who is being evaluated via a billable video visit.      The patient has been notified of following:     \"This video visit will be conducted via a call between you and your physician/provider. We have found that certain health care needs can be provided without the need for an in-person physical exam.  This service lets us provide the care you need with a video conversation.  If a prescription is necessary we can send it directly to your pharmacy.  If lab work is needed we can place an order for that and you can then stop by our lab to have the test done at a later time.    Video visits are billed at different rates depending on your insurance coverage.  Please reach out to your insurance provider with any questions.    If during the course of the call the physician/provider feels a video visit is not appropriate, you will not be charged for this service.\"    Patient has given verbal consent for Video visit? Yes    How would you like to obtain your AVS? E-Mail (inform patient AVS not encrypted) [email or web address]    Patient would like the video invitation sent by: Send to e-mail at: [email or web address]    Will anyone else be joining your video visit? No      Subjective     Blanca Andrew is a 19 year old female who presents to clinic today for the following health issues:    HPI  Jaw pain      Duration: 6 days    Description (location/character/radiation): Patient has TMJ     Intensity:  severe    Accompanying signs and symptoms: Hard to eat and her jaw is locking up    History (similar episodes/previous evaluation): Diagnosed with TMJ    Precipitating or alleviating factors: Ice/Heat helps for a short amount of time    Therapies tried and outcome: OTC medications are not helping       Video Start Time: 11:04 AM    Has been a long standing issue since braces were removed.  Has issues if she eats crunchy foods.  Now irritated for abut 6 days.  Has avoided crunchy foods and chewy " "foods.  6 days go began getting \"swollen and red\" and could not open her jaw.  Not sharp or shooting pain but feels achy and sore.  Finds it hard to eat and talk because of discomfort and fear that her jaw might lock.    Often has clicking and grinding on the left, has always been present.  Feels like face will get hot and TMJ will get red and sore by the end of the day after talking and eating all day.  Improved by the time she wakes in the AM.    Has not visited with anyone specifically for her TMJ in the past.    Has been taking tylenol and acetaminophen with minimal relief.      *  Feels like mood has been good.  Has continued with therapy which is working well.  Doing great off med, has had return of libido  Feels less flat as well.    *  Has been talking with insurance about options for IUD.  Wants to review copper vs hormone IUD's        Reviewed and updated as needed this visit by Provider  Tobacco  Allergies  Meds  Med Hx  Surg Hx  Fam Hx  Soc Hx        Review of Systems   ROS COMP: Constitutional, HEENT, cardiovascular, pulmonary, gi and gu systems are negative, except as otherwise noted.      Objective    There were no vitals taken for this visit.  Estimated body mass index is 25.22 kg/m  as calculated from the following:    Height as of 2/25/20: 1.581 m (5' 2.25\").    Weight as of 2/25/20: 63 kg (139 lb).  Physical Exam     GENERAL: healthy, alert and no distress  EYES: Eyes grossly normal to inspection, conjunctivae and sclerae normal  RESP: no audible wheeze, cough, or visible cyanosis.  No visible retractions or increased work of breathing.  Able to speak fully in complete sentences.  NEURO: Cranial nerves grossly intact, mentation intact and speech normal  PSYCH: mentation appears normal, affect normal/bright, judgement and insight intact, normal speech and appearance well-groomed      Diagnostic Test Results:  none         A/P:  No diagnosis found.        Video-Visit Details    Type of " service:  Video Visit    Video End Time:11:20 AM      Originating Location (pt. Location): Home    Distant Location (provider location):  [facility]     Mode of Communication:  Video Conference via [facility]    No follow-ups on file.       Evangelina Alvarado DO

## 2020-04-25 ENCOUNTER — MYC MEDICAL ADVICE (OUTPATIENT)
Dept: FAMILY MEDICINE | Facility: CLINIC | Age: 20
End: 2020-04-25

## 2020-04-25 DIAGNOSIS — F41.1 GAD (GENERALIZED ANXIETY DISORDER): Primary | ICD-10-CM

## 2020-04-27 RX ORDER — FLUOXETINE 10 MG/1
10 CAPSULE ORAL DAILY
Qty: 30 CAPSULE | Refills: 0 | Status: SHIPPED | OUTPATIENT
Start: 2020-04-27 | End: 2020-05-29

## 2020-05-05 ENCOUNTER — VIRTUAL VISIT (OUTPATIENT)
Dept: PHYSICAL THERAPY | Facility: CLINIC | Age: 20
End: 2020-05-05
Payer: COMMERCIAL

## 2020-05-05 DIAGNOSIS — M26.609 TMJ (TEMPOROMANDIBULAR JOINT SYNDROME): ICD-10-CM

## 2020-05-05 PROCEDURE — 97110 THERAPEUTIC EXERCISES: CPT | Mod: 95 | Performed by: PHYSICAL THERAPIST

## 2020-05-05 PROCEDURE — 97161 PT EVAL LOW COMPLEX 20 MIN: CPT | Mod: 95 | Performed by: PHYSICAL THERAPIST

## 2020-05-05 PROCEDURE — 97530 THERAPEUTIC ACTIVITIES: CPT | Mod: 95 | Performed by: PHYSICAL THERAPIST

## 2020-05-05 NOTE — PROGRESS NOTES
"Physical Therapy Virtual Initial Visit      The patient has been notified of following:     \"This virtual visit will be conducted between you and your provider. We have found that certain health care needs can be provided without the need for physical presence.  This service lets us provide the care you need with a virtual visit.\"    Due to external, as well as internal [facility] management of the COVID-19 Virus, Blanca Andrew was not seen in our clinic.  As a substitution, we implemented a virtual visit to manage this patient's condition utilizing the [employer] virtual visit platform via the patient s existing code.  The provider, Cuba Benoit, reviewed the patient's chart, PTRx prescription, and spoke with the patient to determine the following telemedicine visit is appropriate and effective for the patient's care.    The following type of visit was completed:   Video Visit:  The [employer] platform uses a synchronous HIPAA compliant video stream for this patient encounter.         Subjective:    Therapist Generated HPI Evaluation  Problem details: Patient reports having intermittent TMJ pain for 7 years, since she had her braces removed. Patient reports having an increase in pain, swelling and redness 3 weeks ago without a mechanism of injury or change in daily routine..         Type of problem:  TMJ bilateral.    This is a recurrent condition.  Condition occurred with:  Insidious onset.  Where condition occurred: for unknown reasons.  Patient reports pain:  TMJ right and TMJ left.  Pain is described as aching and is constant.  Pain is worse during the day.  Since onset symptoms are gradually worsening.  Associated symptoms:  Ear ache, joint noise, muscle tightness, stress, tinnitus and tired or painful jaw muscles. Symptoms are exacerbated by chewing, clenching, yawning and opening  and relieved by NSAID's.    There was none improvement following previous treatment.  Work activity restrictions: " student.  Barriers include:  None as reported by patient.  Parafunctional Habits:    Bruxism:  Not aware  Mandibular Habits:  None  Chewing/Biting Nails:  Bites inside of cheek    Clenching:  Throughout day    Caffeine:  2 cups per day, morning time only    Aerobic Exercise:  5-6 days per week aerobic and strength training  Sleep Quality:  Wakes up restored  Patient Health History         Pain is reported as 5/10 on pain scale.  General health as reported by patient is good.              Current medications:  Anti-inflammatory.       Primary job tasks: student.                              Objective:    Standing Alignment:    Cervical/Thoracic:  Forward head  Shoulder/UE:  Rounded shoulders                                Cervical/Thoracic Evaluation  Cervical AROM: normal                                                     TMJ Evaluation  ROM:       AROM TMJ:  Opening:  Moderate limitation, pain at end range of motion approx 30-40mm IO     Left Laterotrusion:  Appears WNL    Right Laterotrusion:  Appears WNL    Protrusion: appears WNL        Associated Findings: Headaches:  None (rare)    Previous Interventions:  Previous interventions tmj: none.      Palpation:  Palpation/muscle tone tmj: instructed on self palpation.  Left side tenderness present at:  Upper Trap; Superficial Masseter and Temporalis  Right side tenderness present at:  Upper Trap; Superficial Masseter and Temporalis    Movement Characteristics:    Click:  On left at end range IO  Crepitus:  None reported  Deviations:  To the right on opening    Early Translation:  Appears to be on left    TMJ Findings:    Tongue Positioning:  Floor          Capsule Palpation:  Left side tenderness present at :  Lateral Capsule                General   ROS    PTRx Content from today's visit:  Exercise Name: TMD Education  Exercise Name: Education Sheet General - Sessions: throughout the day, Notes: Click the Education link above for info on the TMJ that I didnt have  time to discuss today.  ------  Resting position of the TMJ  1. Rest your tongue to the roof of your mouth.   2. Let your teeth hang apart  3. rest your lips together  While you are in the position, run your palms down your face and passively open your jaw at the bottom. Relax and repeated as needed    you should be in this position any time you are not chewing or talking    ---  Massage your cheeks - outside or inside.  Exercise Name: TMJ Rotation and Translation Control Phase I, Sets: 1 - Reps: 5 - Sessions: when you are in front of a mirror  Exercise Name: Cervical Retraction, Sets: 1 - Reps: 5 - Sessions: perform throughout the day    Assessment/Plan:     Patient is a 19 year old female with both sides TMJ complaints.    Patient has the following significant findings with corresponding treatment plan.                Diagnosis 1:  Bilateral TMJD  Pain -  hot/cold therapy, manual therapy, self management, education and home program  Decreased ROM/flexibility - manual therapy, therapeutic exercise, therapeutic activity and home program  Decreased strength - therapeutic exercise, therapeutic activities and home program  Inflammation - cold therapy and self management/home program  Impaired muscle performance - neuro re-education and home program  Decreased function - therapeutic activities and home program  Impaired posture - neuro re-education, therapeutic activities and home program    Therapy Evaluation Codes:   1) History comprised of:   Personal factors that impact the plan of care:      None.    Comorbidity factors that impact the plan of care are:      None.     Medications impacting care: None.  2) Examination of Body Systems comprised of:   Body structures and functions that impact the plan of care:      TMJ.   Activity limitations that impact the plan of care are:      chewing.  3) Clinical presentation characteristics are:   Stable/Uncomplicated.  4) Decision-Making    Low complexity using standardized  patient assessment instrument and/or measureable assessment of functional outcome.  Cumulative Therapy Evaluation is: Low complexity.    Previous and current functional limitations:  (See Goal Flow Sheet for this information)    Short term and Long term goals: (See Goal Flow Sheet for this information)     Communication ability:  Patient appears to be able to clearly communicate and understand verbal and written communication and follow directions correctly.  Treatment Explanation - The following has been discussed with the patient:   RX ordered/plan of care  Anticipated outcomes  Possible risks and side effects  This patient would benefit from PT intervention to resume normal activities.   Rehab potential is good.    Frequency:  1 X week, once daily  Duration:  for 6 weeks  Discharge Plan:  Achieve all LTG.  Independent in home treatment program.  Reach maximal therapeutic benefit.    Please refer to the daily flowsheet for treatment today, total treatment time and time spent performing 1:1 timed codes.       Virtual visit contact time    Time of service began: 10:20 AM  Time of service ended: 11:00 AM  Total Time for set up, visit, and documentation: 50 minutes    Payor: [employer] / Plan: [employer] / Product Type: HMO /     Procedure Code/s   Therapeutic Exercise (07523): 15 minutes  Therapeutic Activities (12971): 15 minutes    I have reviewed the note as documented above.  This accurately captures the substance of my conversation with the patient.  Provider location: [place] (City/State)  Patient location: Home    ___________________________________________________

## 2020-05-08 ENCOUNTER — OFFICE VISIT (OUTPATIENT)
Dept: OBGYN | Facility: CLINIC | Age: 20
End: 2020-05-08
Payer: COMMERCIAL

## 2020-05-08 VITALS
TEMPERATURE: 96.4 F | HEART RATE: 72 BPM | SYSTOLIC BLOOD PRESSURE: 124 MMHG | DIASTOLIC BLOOD PRESSURE: 82 MMHG | BODY MASS INDEX: 32.42 KG/M2 | RESPIRATION RATE: 16 BRPM | WEIGHT: 140.1 LBS | HEIGHT: 55 IN

## 2020-05-08 DIAGNOSIS — Z30.430 ENCOUNTER FOR INSERTION OF INTRAUTERINE CONTRACEPTIVE DEVICE: ICD-10-CM

## 2020-05-08 DIAGNOSIS — Z30.430 ENCOUNTER FOR IUD INSERTION: Primary | ICD-10-CM

## 2020-05-08 LAB — HCG UR QL: NEGATIVE

## 2020-05-08 PROCEDURE — 81025 URINE PREGNANCY TEST: CPT | Performed by: OBSTETRICS & GYNECOLOGY

## 2020-05-08 PROCEDURE — 58300 INSERT INTRAUTERINE DEVICE: CPT | Performed by: OBSTETRICS & GYNECOLOGY

## 2020-05-08 ASSESSMENT — MIFFLIN-ST. JEOR: SCORE: 1198.96

## 2020-05-08 NOTE — PROGRESS NOTES
IUD Placement Procedure Note    Blanca Anderw  2000  [id number]    The patient was counseled on the risks (including including risk of infection, uterine perforation, cramping), benefits (high efficacy, low maintenance birth control, reduced risk of uterine cancer and hyperplasia, improvement in menstrual bleeding), and alternatives of the procedure. Verbal and written consent were obtained.  A UPT was negative prior to the procedure.    Technique: The patient was placed in the dorsal lithotomy position.  A speculum was placed in the vagina and the cervix was cleaned with betadine swabsx3. The anterior cervical lip was grasped with a tenaculum. The Mirena IUD was loaded, advanced to the fundus, pulled back 1cm, deployed and advanced to the fundus. Using the insertor as a sound, the uterus sounded to 8 cm. IUD strings were cut 3cm from the external cervical os. The patient tolerated the procedure well and there were no complications. EBL: 0cc.     Discussed option of returning to clinic for a string check.  She well attempt at home in 4 weeks and return for string check if unable to palpate.    Lucia Mckeon MD  5/8/2020 9:55 AM

## 2020-05-19 ENCOUNTER — VIRTUAL VISIT (OUTPATIENT)
Dept: PHYSICAL THERAPY | Facility: CLINIC | Age: 20
End: 2020-05-19
Payer: COMMERCIAL

## 2020-05-19 DIAGNOSIS — M26.609 TMJ (TEMPOROMANDIBULAR JOINT SYNDROME): Primary | ICD-10-CM

## 2020-05-19 PROCEDURE — 97110 THERAPEUTIC EXERCISES: CPT | Mod: 95 | Performed by: PHYSICAL THERAPIST

## 2020-05-19 NOTE — PROGRESS NOTES
"Physical Therapy Virtual Follow Up Visit      The patient has been notified of following:     \"This virtual visit will be conducted between you and your provider. We have found that certain health care needs can be provided without the need for physical presence.  This service lets us provide the care you need with a virtual visit.\"    Due to external, as well as internal [facility] management of the COVID-19 Virus, Blanca Andrew was not seen in our clinic.  As a substitution, we implemented a virtual visit to manage this patient's condition utilizing the [employer] virtual visit platform via the patient s existing code.  The provider, Cuba Benoit, reviewed the patient's chart, PTRx prescription, and spoke with the patient to determine the following telemedicine visit is appropriate and effective for the patient's care.    The following type of visit was completed:   Video Visit:  The [employer] platform uses a synchronous HIPAA compliant video stream for this patient encounter.        S: Blanca Andrew is a 19 year old female. Connected virtually on the [employer] platform to discuss their condition/progress. They noted improvements in pain, function, fatigue.  They noted ongoing pain or limitations with none.     Current pain level: 0/10  Patient reports feeling significantly better since last PT session and she no longer takes NSAIDs daily, but instead 2-3 days per week at most. Patient reports continued neck pain, but not every day. Patient reports an overall 70% improvement in symptoms. Patient reports much better awareness on her clenching and found it is related to stress and thinking about school    O: Patient demonstrated: mild deviation to the left on opening, audible click reported. This has improved since last PT session per pt report     Resting position of the TMJ  1. Rest your tongue to the roof of your mouth.   2. Let your teeth hang apart  3. rest your lips together  While you are in the position, run your " palms down your face and passively open your jaw at the bottom. Relax and repeated as needed    you should be in this position any time you are not chewing or talking    ---  Massage your cheeks - outside or inside. wash those hands! The fibers of the muscles run up and down for the most part, so rub them in that direction (with the muscle fibers) also use a massage device to massage the muscles in your shoulders/neck.   Exercise Name: TMJ Rotation and Translation Control Phase I, Sets: 1 - Reps: 5 - Sessions: when you are in front of a mirror  Exercise Name: Cervical Retraction, Sets: 1 - Reps: 5 - Sessions: perform throughout the day  Exercise Name: Upper Cervical Strengthening Extension, Sets: 3 - Reps: 10 - Sessions: 3  Exercise Name: Deep Neck Flexors Endurance Training , Sets: 3 - Reps: 5 reps with 5 sec holds - Sessions: 3 days per week    A:   Patient's symptoms are resolving.  Response to therapy has shown an improvement in  pain level, posture and function      P: Patient will continue with the exercise program assigned on their PTRx code and will add the following measures to manage their pain/condition: Patient will follow up with PT in 2 weeks     Current treatment program is being advanced to more complex exercises.      Virtual visit contact time    Time of service began: 8:20 AM  Time of service ended: 9:00 AM  Total Time for set up, visit, and documentation: 50 minutes    Payor: [facility] / Plan: [employer] / Product Type: HMO /   .  Procedure Code/s   Therapeutic Exercise (69316): 30 minutes    I have reviewed the note as documented above.  This accurately captures the substance of my conversation with the patient.  Provider location: [facility] ([facility])  Patient location: Home

## 2020-05-28 ENCOUNTER — VIRTUAL VISIT (OUTPATIENT)
Dept: FAMILY MEDICINE | Facility: CLINIC | Age: 20
End: 2020-05-28
Payer: COMMERCIAL

## 2020-05-28 DIAGNOSIS — F41.9 ANXIETY: Primary | ICD-10-CM

## 2020-05-28 PROCEDURE — 99207 ZZC NO BILLABLE SERVICE THIS VISIT: CPT | Mod: GT | Performed by: NURSE PRACTITIONER

## 2020-05-28 NOTE — PROGRESS NOTES
Attempted to call pt. X 3 on Cell phone number and home phone no answer on either. Left messages. 3rd attempt pt. Was notified she would need to call an reschedule appointment.     Shereen Rossi, CMA

## 2020-05-29 ENCOUNTER — VIRTUAL VISIT (OUTPATIENT)
Dept: FAMILY MEDICINE | Facility: CLINIC | Age: 20
End: 2020-05-29
Payer: COMMERCIAL

## 2020-05-29 DIAGNOSIS — F41.1 GAD (GENERALIZED ANXIETY DISORDER): ICD-10-CM

## 2020-05-29 PROCEDURE — 96127 BRIEF EMOTIONAL/BEHAV ASSMT: CPT | Performed by: NURSE PRACTITIONER

## 2020-05-29 PROCEDURE — 99213 OFFICE O/P EST LOW 20 MIN: CPT | Mod: 95 | Performed by: NURSE PRACTITIONER

## 2020-05-29 RX ORDER — FLUOXETINE 10 MG/1
10 CAPSULE ORAL DAILY
Qty: 90 CAPSULE | Refills: 1 | Status: SHIPPED | OUTPATIENT
Start: 2020-05-29 | End: 2020-06-25

## 2020-05-29 ASSESSMENT — ANXIETY QUESTIONNAIRES
3. WORRYING TOO MUCH ABOUT DIFFERENT THINGS: SEVERAL DAYS
GAD7 TOTAL SCORE: 3
1. FEELING NERVOUS, ANXIOUS, OR ON EDGE: NOT AT ALL
2. NOT BEING ABLE TO STOP OR CONTROL WORRYING: SEVERAL DAYS
7. FEELING AFRAID AS IF SOMETHING AWFUL MIGHT HAPPEN: NOT AT ALL
5. BEING SO RESTLESS THAT IT IS HARD TO SIT STILL: NOT AT ALL
IF YOU CHECKED OFF ANY PROBLEMS ON THIS QUESTIONNAIRE, HOW DIFFICULT HAVE THESE PROBLEMS MADE IT FOR YOU TO DO YOUR WORK, TAKE CARE OF THINGS AT HOME, OR GET ALONG WITH OTHER PEOPLE: NOT DIFFICULT AT ALL
6. BECOMING EASILY ANNOYED OR IRRITABLE: NOT AT ALL

## 2020-05-29 ASSESSMENT — PATIENT HEALTH QUESTIONNAIRE - PHQ9
SUM OF ALL RESPONSES TO PHQ QUESTIONS 1-9: 3
5. POOR APPETITE OR OVEREATING: SEVERAL DAYS

## 2020-05-29 NOTE — PROGRESS NOTES
"Blanca Andrew is a 19 year old female who is being evaluated via a billable video visit.      The patient has been notified of following:     \"This video visit will be conducted via a call between you and your physician/provider. We have found that certain health care needs can be provided without the need for an in-person physical exam.  This service lets us provide the care you need with a video conversation.  If a prescription is necessary we can send it directly to your pharmacy.  If lab work is needed we can place an order for that and you can then stop by our lab to have the test done at a later time.    Video visits are billed at different rates depending on your insurance coverage.  Please reach out to your insurance provider with any questions.    If during the course of the call the physician/provider feels a video visit is not appropriate, you will not be charged for this service.\"    Patient has given verbal consent for Video visit? YES     How would you like to obtain your AVS? LindaAntimony    Patient would like the video invitation sent by: Send to e-mail at: [email or web address]    Will anyone else be joining your video visit? No      Subjective     Blanca Andrew is a 19 year old female who presents today via video visit for the following health issues:    HPI  Depression Followup    How are you doing with your depression since your last visit? Improved     Are you having other symptoms that might be associated with depression? No    Have you had a significant life event?  No     Are you feeling anxious or having panic attacks?   No    Do you have any concerns with your use of alcohol or other drugs? No    Social History     Tobacco Use     Smoking status: Never Smoker     Smokeless tobacco: Never Used   Substance Use Topics     Alcohol use: No     Alcohol/week: 0.0 standard drinks     Drug use: No     PHQ 1/21/2020 2/25/2020   PHQ-9 Total Score 16 2   Q9: Thoughts of better off dead/self-harm past 2 weeks " Several days Not at all     ERIN-7 SCORE 1/21/2020 2/25/2020   Total Score 11 11     Last PHQ-9 5/29/2020   1.  Little interest or pleasure in doing things 0   2.  Feeling down, depressed, or hopeless 0   3.  Trouble falling or staying asleep, or sleeping too much 1   4.  Feeling tired or having little energy 1   5.  Poor appetite or overeating 0   6.  Feeling bad about yourself 1   7.  Trouble concentrating 0   8.  Moving slowly or restless 0   Q9: Thoughts of better off dead/self-harm past 2 weeks 0   PHQ-9 Total Score 3   Difficulty at work, home, or with people Not difficult at all     ERIN-7  5/29/2020   1. Feeling nervous, anxious, or on edge 0   2. Not being able to stop or control worrying 1   3. Worrying too much about different things 1   4. Trouble relaxing 1   5. Being so restless that it is hard to sit still 0   6. Becoming easily annoyed or irritable 0   7. Feeling afraid, as if something awful might happen 0   ERIN-7 Total Score 3   If you checked any problems, how difficult have they made it for you to do your work, take care of things at home, or get along with other people? Not difficult at all     In the past two weeks have you had thoughts of suicide or self-harm?  No.    Do you have concerns about your personal safety or the safety of others?   No    Suicide Assessment Five-step Evaluation and Treatment (SAFE-T)      How many days per week do you miss taking your medication? 0         Video Start Time: 9:23 AM        Patient Active Problem List   Diagnosis     Torus fracture of radius (alone)     Avulsion fracture of middle phalanx of finger     Encounter for IUD insertion     Past Surgical History:   Procedure Laterality Date     NO HISTORY OF SURGERY         Social History     Tobacco Use     Smoking status: Never Smoker     Smokeless tobacco: Never Used   Substance Use Topics     Alcohol use: No     Alcohol/week: 0.0 standard drinks     Family History   Problem Relation Age of Onset     Asthma  "Mother      Diabetes Father      Hypertension Father      Hyperlipidemia Father          Current Outpatient Medications   Medication Sig Dispense Refill     cyanocobalamin (VITAMIN B-12) 1000 MCG tablet Take 1,000 mcg by mouth daily       FLUoxetine (PROZAC) 10 MG capsule Take 1 capsule (10 mg) by mouth daily 30 capsule 0     levonorgestrel (MIRENA) 20 MCG/24HR IUD 1 each (20 mcg) by Intrauterine route once       MULTIVITAMINS/FL PO Take by mouth daily        naproxen (NAPROSYN) 500 MG tablet Take 1 tablet (500 mg) by mouth 2 times daily (with meals) 30 tablet 0     Vitamin D3 (CHOLECALCIFEROL) 25 mcg (1000 units) tablet Take 50 mcg by mouth daily       Allergies   Allergen Reactions     Raspberry Cough and Other (See Comments)     BP Readings from Last 3 Encounters:   05/08/20 124/82   02/25/20 124/76   01/21/20 120/70    Wt Readings from Last 3 Encounters:   05/08/20 63.5 kg (140 lb 1.6 oz)   02/25/20 63 kg (139 lb)   01/21/20 62.7 kg (138 lb 3.2 oz)                    Reviewed and updated as needed this visit by Provider         Review of Systems   CONSTITUTIONAL: NEGATIVE for fever, chills, change in weight  ENT/MOUTH: NEGATIVE for ear, mouth and throat problems  RESP: NEGATIVE for significant cough or SOB  CV: NEGATIVE for chest pain, palpitations or peripheral edema  : is current with GYN   PSYCHIATRIC: NEGATIVE for changes in mood or affect and states the medication is working well,    Does have test anxiety , is Taking tests on line and the freedman is watching her = that does cause increased anxiety would like a letter asking for increased test time. Does well with in class testing it is the online testing  When a freedman is watching her take the test is more anxiety       Objective    LMP 04/30/2020   Estimated body mass index is 36.08 kg/m  as calculated from the following:    Height as of 5/8/20: 1.327 m (4' 4.25\").    Weight as of 5/8/20: 63.5 kg (140 lb 1.6 oz).  Physical Exam     GENERAL: Healthy, " alert and no distress  EYES: Eyes grossly normal to inspection.  No discharge or erythema, or obvious scleral/conjunctival abnormalities.  RESP: No audible wheeze, cough, or visible cyanosis.  No visible retractions or increased work of breathing.    NEURO: Cranial nerves grossly intact.  Mentation and speech appropriate for age.  PSYCH: Mentation appears normal, affect normal/bright, judgement and insight intact, normal speech and appearance well-groomed.  PHQ-9 and GAD7  Scores are well controlled.          Diagnostic Test Results:  Labs reviewed in Epic  none       ASSESSMENT/PLAN:    ASSESSMENT/PLAN:      ICD-10-CM    1. ERIN (generalized anxiety disorder)  F41.1 FLUoxetine (PROZAC) 10 MG capsule       Patient Instructions   Happy that you are doing well,     I did reorder your medication for  3 months and a refill.  You do need to check in at least every 6 months.     I did  Also write the letter for extended test time when taking online tests.  Good luck     Enjoy the nicer weather.                    Video-Visit Details    Type of service:  Video Visit    Video End Time:9:32 AM    Originating Location (pt. Location): Home    Distant Location (provider location):  [facility]     Platform used for Video Visit: Doximmindy    No follow-ups on file.

## 2020-05-29 NOTE — PATIENT INSTRUCTIONS
Happy that you are doing well,     I did reorder your medication for  3 months and a refill.  You do need to check in at least every 6 months.     I did  Also write the letter for extended test time when taking online tests.  Good luck     Enjoy the nicer weather.

## 2020-05-29 NOTE — LETTER
[address]  [address]  Ph. [phone]  Fax [phone]      05/29/2020          To Whom It May Concern,      Please allow Blanca more time taking the online tests.  She does have depression/anxiety that is successfully treated with medication.  Due to the change to online testing and having someone watching her take the test has created  increased test anxiety. Please allow her extended time to take her online tests.             Sincerely,             Kelsey PINEDA-CNP

## 2020-05-30 ASSESSMENT — ANXIETY QUESTIONNAIRES: GAD7 TOTAL SCORE: 3

## 2020-06-02 ENCOUNTER — THERAPY VISIT (OUTPATIENT)
Dept: PHYSICAL THERAPY | Facility: CLINIC | Age: 20
End: 2020-06-02
Payer: COMMERCIAL

## 2020-06-02 DIAGNOSIS — M26.609 TEMPOROMANDIBULAR JOINT DISORDER: Primary | ICD-10-CM

## 2020-06-02 PROCEDURE — 97110 THERAPEUTIC EXERCISES: CPT | Mod: GP | Performed by: PHYSICAL THERAPIST

## 2020-06-02 PROCEDURE — 97140 MANUAL THERAPY 1/> REGIONS: CPT | Mod: GP | Performed by: PHYSICAL THERAPIST

## 2020-06-25 ENCOUNTER — MYC REFILL (OUTPATIENT)
Dept: FAMILY MEDICINE | Facility: CLINIC | Age: 20
End: 2020-06-25

## 2020-06-25 DIAGNOSIS — F41.1 GAD (GENERALIZED ANXIETY DISORDER): ICD-10-CM

## 2020-06-29 RX ORDER — FLUOXETINE 10 MG/1
10 CAPSULE ORAL DAILY
Qty: 90 CAPSULE | Refills: 1 | Status: SHIPPED | OUTPATIENT
Start: 2020-06-29 | End: 2020-06-30

## 2020-06-30 ENCOUNTER — TELEPHONE (OUTPATIENT)
Dept: FAMILY MEDICINE | Facility: CLINIC | Age: 20
End: 2020-06-30

## 2020-06-30 ENCOUNTER — MYC REFILL (OUTPATIENT)
Dept: FAMILY MEDICINE | Facility: CLINIC | Age: 20
End: 2020-06-30

## 2020-06-30 DIAGNOSIS — F41.1 GAD (GENERALIZED ANXIETY DISORDER): ICD-10-CM

## 2020-06-30 NOTE — TELEPHONE ENCOUNTER
Central Prior Authorization Team   Phone: [phone]      Prior Authorization Not Needed per Insurance    06/30/2020  Medication: Fluoxetine - NOT NEEDED  Insurance Company: [employer] ([facility]) - Phone [phone] Fax [phone]  Expected CoPay:      Pharmacy Filling the Rx: [id number] - [facility] [address]  Pharmacy Notified: Yes  Patient Notified: Yes (**Instructed pharmacy to notify patient when script is ready to /ship.**)    Pharmacy received a paid claim.

## 2020-06-30 NOTE — TELEPHONE ENCOUNTER
Received a CoverMyMeds prior authorization request from [facility] for Fluoxetine 10mg caps    Routed to the [employer] electronic prior authorization team          Demarco SMITH (r)  [facility]

## 2020-06-30 NOTE — TELEPHONE ENCOUNTER
Central Prior Authorization Team   Phone: [phone]      PA Initiation    Medication: Fluoxetine - INITIATED  Insurance Company: [employer] ([facility]) - Phone [phone] Fax [phone]  Pharmacy Filling the Rx: [facility] # [id number] - [place], [place] - [address]  Filling Pharmacy Phone: [phone]  Filling Pharmacy Fax: [phone]  Start Date: 6/30/2020

## 2020-07-01 RX ORDER — FLUOXETINE 10 MG/1
10 CAPSULE ORAL DAILY
Qty: 90 CAPSULE | Refills: 1 | Status: SHIPPED | OUTPATIENT
Start: 2020-07-01 | End: 2020-12-28 | Stop reason: DRUGHIGH

## 2020-10-11 ENCOUNTER — MYC MEDICAL ADVICE (OUTPATIENT)
Dept: FAMILY MEDICINE | Facility: CLINIC | Age: 20
End: 2020-10-11

## 2020-11-16 ENCOUNTER — MYC MEDICAL ADVICE (OUTPATIENT)
Dept: FAMILY MEDICINE | Facility: CLINIC | Age: 20
End: 2020-11-16

## 2020-11-16 DIAGNOSIS — Z20.822 SUSPECTED COVID-19 VIRUS INFECTION: Primary | ICD-10-CM

## 2020-11-18 NOTE — PROGRESS NOTES
Bedside report received.    Assessment complete.  A&O x 4. Patient calls appropriately.  Patient ambualtes with standby assist.   Patient has 2/10 pain. PCA in use. Patient reports adequate pain control  Denies N&V. Tolerating rergular diet.  Surgical sites x2 right chest. Chest tubes x2 right chest.  + void, + flatus  Patient denies SOB.    Review plan with of care with patient. Call light and personal belongings with in reach. Hourly rounding in place. All needs met at this time.   Discharge Note    Progress reporting period is from last progress note on   to May 5, 2020.    Blanca failed to follow up and current status is unknown.  Please see information below for last relevant information on current status.  Patient seen for 1 visits.    SUBJECTIVE  Subjective changes noted by patient:     .  Current pain level is  .     Previous pain level was   .   Changes in function:  Yes (See Goal flowsheet attached for changes in current functional level)  Adverse reaction to treatment or activity: None    OBJECTIVE  Changes noted in objective findings:       ASSESSMENT/PLAN  Diagnosis: TMJD   Updated problem list and treatment plan:   Pain - HEP  STG/LTGs have been met or progress has been made towards goals:  Yes, please see goal flowsheet for most current information  Assessment of Progress: current status is unknown.    Last current status:     Self Management Plans:  HEP  I have re-evaluated this patient and find that the nature, scope, duration and intensity of the therapy is appropriate for the medical condition of the patient.  Blanca continues to require the following intervention to meet STG and LTG's:  HEP.    Recommendations:  Discharge with current home program.  Patient to follow up with MD as needed.    Please refer to the daily flowsheet for treatment today, total treatment time and time spent performing 1:1 timed codes.

## 2020-11-22 ENCOUNTER — MYC MEDICAL ADVICE (OUTPATIENT)
Dept: FAMILY MEDICINE | Facility: CLINIC | Age: 20
End: 2020-11-22

## 2020-11-22 ENCOUNTER — HEALTH MAINTENANCE LETTER (OUTPATIENT)
Age: 20
End: 2020-11-22

## 2020-11-22 DIAGNOSIS — Z20.822 SUSPECTED COVID-19 VIRUS INFECTION: Primary | ICD-10-CM

## 2020-12-28 ENCOUNTER — VIRTUAL VISIT (OUTPATIENT)
Dept: FAMILY MEDICINE | Facility: CLINIC | Age: 20
End: 2020-12-28
Payer: COMMERCIAL

## 2020-12-28 DIAGNOSIS — F41.1 GAD (GENERALIZED ANXIETY DISORDER): Primary | ICD-10-CM

## 2020-12-28 DIAGNOSIS — F33.42 MAJOR DEPRESSIVE DISORDER, RECURRENT EPISODE, IN FULL REMISSION (H): ICD-10-CM

## 2020-12-28 PROCEDURE — 99213 OFFICE O/P EST LOW 20 MIN: CPT | Mod: 95 | Performed by: FAMILY MEDICINE

## 2020-12-28 ASSESSMENT — ANXIETY QUESTIONNAIRES
3. WORRYING TOO MUCH ABOUT DIFFERENT THINGS: SEVERAL DAYS
5. BEING SO RESTLESS THAT IT IS HARD TO SIT STILL: NOT AT ALL
IF YOU CHECKED OFF ANY PROBLEMS ON THIS QUESTIONNAIRE, HOW DIFFICULT HAVE THESE PROBLEMS MADE IT FOR YOU TO DO YOUR WORK, TAKE CARE OF THINGS AT HOME, OR GET ALONG WITH OTHER PEOPLE: SOMEWHAT DIFFICULT
2. NOT BEING ABLE TO STOP OR CONTROL WORRYING: SEVERAL DAYS
1. FEELING NERVOUS, ANXIOUS, OR ON EDGE: MORE THAN HALF THE DAYS
6. BECOMING EASILY ANNOYED OR IRRITABLE: SEVERAL DAYS
7. FEELING AFRAID AS IF SOMETHING AWFUL MIGHT HAPPEN: NOT AT ALL
GAD7 TOTAL SCORE: 6

## 2020-12-28 ASSESSMENT — PATIENT HEALTH QUESTIONNAIRE - PHQ9
5. POOR APPETITE OR OVEREATING: SEVERAL DAYS
SUM OF ALL RESPONSES TO PHQ QUESTIONS 1-9: 4

## 2020-12-28 NOTE — PROGRESS NOTES
"Blanca Andrew is a 20 year old female who is being evaluated via a billable video visit.      The patient has been notified of following:     \"This video visit will be conducted via a call between you and your physician/provider. We have found that certain health care needs can be provided without the need for an in-person physical exam.  This service lets us provide the care you need with a video conversation.  If a prescription is necessary we can send it directly to your pharmacy.  If lab work is needed we can place an order for that and you can then stop by our lab to have the test done at a later time.    Video visits are billed at different rates depending on your insurance coverage.  Please reach out to your insurance provider with any questions.    If during the course of the call the physician/provider feels a video visit is not appropriate, you will not be charged for this service.\"    Patient has given verbal consent for Video visit? Yes  How would you like to obtain your AVS? MyChart  If you are dropped from the video visit, the video invite should be resent to: Text to cell phone: [phone]  Will anyone else be joining your video visit? No     Subjective     Blanca Andrew is a 20 year old female who presents today via video visit for the following health issues:    HPI     Depression and Anxiety Follow-Up    How are you doing with your depression since your last visit? No change, things are going well since last visit    How are you doing with your anxiety since your last visit?  Improved, things are going well it is more situational anxiety but feels like its well managed    Are you having other symptoms that might be associated with depression or anxiety? No    Have you had a significant life event? No     Do you have any concerns with your use of alcohol or other drugs? No    Social History     Tobacco Use     Smoking status: Never Smoker     Smokeless tobacco: Never Used   Substance Use Topics "     Alcohol use: No     Alcohol/week: 0.0 standard drinks     Drug use: No     PHQ 2/25/2020 5/29/2020 12/28/2020   PHQ-9 Total Score 2 3 4   Q9: Thoughts of better off dead/self-harm past 2 weeks Not at all Not at all Not at all     ERIN-7 SCORE 2/25/2020 5/29/2020 12/28/2020   Total Score 11 3 6     Last PHQ-9 12/28/2020   1.  Little interest or pleasure in doing things 0   2.  Feeling down, depressed, or hopeless 0   3.  Trouble falling or staying asleep, or sleeping too much 1   4.  Feeling tired or having little energy 0   5.  Poor appetite or overeating 1   6.  Feeling bad about yourself 0   7.  Trouble concentrating 2   8.  Moving slowly or restless 0   Q9: Thoughts of better off dead/self-harm past 2 weeks 0   PHQ-9 Total Score 4   Difficulty at work, home, or with people Somewhat difficult     ERIN-7  12/28/2020   1. Feeling nervous, anxious, or on edge 2   2. Not being able to stop or control worrying 1   3. Worrying too much about different things 1   4. Trouble relaxing 1   5. Being so restless that it is hard to sit still 0   6. Becoming easily annoyed or irritable 1   7. Feeling afraid, as if something awful might happen 0   ERIN-7 Total Score 6   If you checked any problems, how difficult have they made it for you to do your work, take care of things at home, or get along with other people? Somewhat difficult       Suicide Assessment Five-step Evaluation and Treatment (SAFE-T)      How many servings of fruits and vegetables do you eat daily?  4 or more    On average, how many sweetened beverages do you drink each day (Examples: soda, juice, sweet tea, etc.  Do NOT count diet or artificially sweetened beverages)?   0    How many days per week do you exercise enough to make your heart beat faster? 5    How many minutes a day do you exercise enough to make your heart beat faster? 60 or more    How many days per week do you miss taking your medication? 0        Video Start Time: 10:58 AM    Overall feels  like things are going well.  Mood has been good.  No trouble with depression.  Feels mood has really been well controlled.    Has been struggling more with some anxiety however.  Busy now with work and studying for MCAT's and stress of pandemic.  Has been using all the skills she learned in therapy but still getting somewhat overwhelmed and stuck.  Finds it hard to do things when she knows something is coming up.  For example, did not go to the gym this morning because she knew this appointment with coming up and did not feel she could do both.    Review of Systems   Constitutional, HEENT, cardiovascular, pulmonary, gi and gu systems are negative, except as otherwise noted.      Objective           Vitals:  No vitals were obtained today due to virtual visit.    Physical Exam     GENERAL: Healthy, alert and no distress  EYES: Eyes grossly normal to inspection.  No discharge or erythema, or obvious scleral/conjunctival abnormalities.  RESP: No audible wheeze, cough, or visible cyanosis.  No visible retractions or increased work of breathing.    SKIN: Visible skin clear. No significant rash, abnormal pigmentation or lesions.  NEURO: Cranial nerves grossly intact.  Mentation and speech appropriate for age.  PSYCH: Mentation appears normal, affect normal/bright, judgement and insight intact, normal speech and appearance well-groomed.      Epic reviewed        A/P:      ICD-10-CM    1. ERIN (generalized anxiety disorder)  F41.1 FLUoxetine (PROZAC) 20 MG capsule   2. Major depressive disorder, recurrent episode, in full remission (H)  F33.42 FLUoxetine (PROZAC) 20 MG capsule     Will try a small dose increase to try to better address the situation anxiety.  Pt will let me know if she has any side effects at the higher dose.  F/u 4-6 weeks via [facility] or virtual.    Video-Visit Details    Type of service:  Video Visit    Video End Time:11:07 AM  Originating Location (pt. Location): Home    Distant Location (provider  location):  [facility]     Platform used for Video Visit: Sherif

## 2020-12-29 ASSESSMENT — ANXIETY QUESTIONNAIRES: GAD7 TOTAL SCORE: 6

## 2021-03-22 ENCOUNTER — MYC MEDICAL ADVICE (OUTPATIENT)
Dept: FAMILY MEDICINE | Facility: CLINIC | Age: 21
End: 2021-03-22

## 2021-03-22 DIAGNOSIS — Z11.3 SCREEN FOR STD (SEXUALLY TRANSMITTED DISEASE): Primary | ICD-10-CM

## 2021-03-25 DIAGNOSIS — Z11.3 SCREEN FOR STD (SEXUALLY TRANSMITTED DISEASE): ICD-10-CM

## 2021-03-25 LAB
HIV 1+2 AB+HIV1 P24 AG SERPL QL IA: NONREACTIVE
T PALLIDUM AB SER QL: NONREACTIVE

## 2021-03-25 PROCEDURE — 99000 SPECIMEN HANDLING OFFICE-LAB: CPT | Performed by: FAMILY MEDICINE

## 2021-03-25 PROCEDURE — 87491 CHLMYD TRACH DNA AMP PROBE: CPT | Performed by: FAMILY MEDICINE

## 2021-03-25 PROCEDURE — 87591 N.GONORRHOEAE DNA AMP PROB: CPT | Performed by: FAMILY MEDICINE

## 2021-03-25 PROCEDURE — 36415 COLL VENOUS BLD VENIPUNCTURE: CPT | Performed by: FAMILY MEDICINE

## 2021-03-25 PROCEDURE — 86780 TREPONEMA PALLIDUM: CPT | Mod: 90 | Performed by: FAMILY MEDICINE

## 2021-03-25 PROCEDURE — 87389 HIV-1 AG W/HIV-1&-2 AB AG IA: CPT | Performed by: FAMILY MEDICINE

## 2021-04-04 ENCOUNTER — HEALTH MAINTENANCE LETTER (OUTPATIENT)
Age: 21
End: 2021-04-04

## 2021-09-03 ENCOUNTER — VIRTUAL VISIT (OUTPATIENT)
Dept: URGENT CARE | Facility: CLINIC | Age: 21
End: 2021-09-03
Payer: COMMERCIAL

## 2021-09-03 DIAGNOSIS — T88.7XXA MEDICATION SIDE EFFECTS: Primary | ICD-10-CM

## 2021-09-03 PROCEDURE — 99204 OFFICE O/P NEW MOD 45 MIN: CPT | Mod: 95

## 2021-09-03 RX ORDER — DULOXETIN HYDROCHLORIDE 20 MG/1
40 CAPSULE, DELAYED RELEASE ORAL DAILY
COMMUNITY
Start: 2021-08-30 | End: 2022-06-16

## 2021-09-03 NOTE — PROGRESS NOTES
Blanca is a 20 year old who is being evaluated via a billable video visit.      Video Start Time: 5:33pm    Assessment & Plan     Medication side effects: very typical timeline and common side effect from initiation of cymbalta. She started on 40mg but was given two 20mg tablets to take daily and has not been able to reach her psychiatry office since the symptoms started. She is overall doing ok, just some waves of nausea and has vomited about once a day but has no other symptoms to suggest infection such as diarrhea (in fact she is a little constipated) or fever. I recommend she cut back to 20mg for at least 3 days (up to a week) and then try to go back up to 40mg. Note for missed work sent. Discussed concerning symptoms for which to return to urgent care or the ED.     10 minutes spent on the date of the encounter doing patient visit and documentation     Return in about 1 week (around 9/10/2021) for with your psychiatrist about your medication.    Christine Carmichael MD  Family Medicine   Virtual Urgent Care  [facility] VIRTUAL URGENT CARE    Subjective   Blanca is a 20 year old who presents for nausea and vomiting.    HPI   Has had nausea since Wednesday and has vomited once daily since then as well.   Started cymbalta Tuesday (40mg dosed in two 20mg tablets daily). Woke up the next day feeling a little nauseated. When she got home from work that day, she was tired. Vomited x1 that night, then once yesterday and once today.   Appetite is low.   She hasn't had a bowel movement since Wednesday but does not have abdominal pain. No diarrhea, no fever, headache, sore throat, malaise or chills.     Review of Systems   As above      Objective           Vitals:  No vitals were obtained today due to virtual visit.    Physical Exam   GENERAL: Healthy, alert and no distress  EYES: Eyes grossly normal to inspection.  No discharge or erythema, or obvious scleral/conjunctival abnormalities.  SKIN: Visible skin clear. No  significant rash, abnormal pigmentation or lesions.  NEURO: Cranial nerves grossly intact.  Mentation and speech appropriate for age.  PSYCH: Mentation appears normal, affect normal/bright, judgement and insight intact, normal speech and appearance well-groomed.          Video-Visit Details    Type of service:  Video Visit    Video End Time:5:46pm    Originating Location (pt. Location): Home    Distant Location (provider location):  [facility] URGENT CARE     Platform used for Video Visit: [employer]

## 2021-09-03 NOTE — LETTER
September 3, 2021      Blanca Andrew  [address] [place], [place] [id number]        To Whom It May Concern:    Blanca Andrew  was seen on 9/3/2021.  Please excuse her absences over the last two days. She may return to work on Tuesday 9/7/2021.         Sincerely,    Electronically signed by:   Christine Carmichael MD  Family Medicine

## 2021-09-19 ENCOUNTER — HEALTH MAINTENANCE LETTER (OUTPATIENT)
Age: 21
End: 2021-09-19

## 2022-01-05 ENCOUNTER — MYC MEDICAL ADVICE (OUTPATIENT)
Dept: FAMILY MEDICINE | Facility: CLINIC | Age: 22
End: 2022-01-05
Payer: COMMERCIAL

## 2022-02-24 ENCOUNTER — E-VISIT (OUTPATIENT)
Dept: FAMILY MEDICINE | Facility: CLINIC | Age: 22
End: 2022-02-24
Payer: COMMERCIAL

## 2022-02-24 DIAGNOSIS — N39.0 ACUTE UTI (URINARY TRACT INFECTION): Primary | ICD-10-CM

## 2022-02-24 PROCEDURE — 99421 OL DIG E/M SVC 5-10 MIN: CPT | Performed by: FAMILY MEDICINE

## 2022-02-24 RX ORDER — NITROFURANTOIN 25; 75 MG/1; MG/1
100 CAPSULE ORAL 2 TIMES DAILY
Qty: 10 CAPSULE | Refills: 0 | Status: SHIPPED | OUTPATIENT
Start: 2022-02-24 | End: 2022-03-01

## 2022-02-24 NOTE — PATIENT INSTRUCTIONS
Dear Blanca Andrew    After reviewing your responses, I've been able to diagnose you with a urinary tract infection, which is a common infection of the bladder with bacteria.  This is not a sexually transmitted infection, though urinating immediately after intercourse can help prevent infections.  Drinking lots of fluids is also helpful to clear your current infection and prevent the next one.      I have sent a prescription for antibiotics to your pharmacy to treat this infection.    It is important that you take all of your prescribed medication even if your symptoms are improving after a few doses.  Taking all of your medicine helps prevent the symptoms from returning.     If your symptoms worsen, you develop pain in your back or stomach, develop fevers, or are not improving in 5 days, please contact your primary care provider for an appointment or visit any of our convenient Walk-in or Urgent Care Centers to be seen, which can be found on our website here.    Thanks again for choosing us as your health care partner,    Evangelina Biajulio Alvarado, DO    Urinary Tract Infections in Women  Urinary tract infections (UTIs) are most often caused by bacteria. These bacteria enter the urinary tract. The bacteria may come from inside the body. Or they may travel from the skin outside the rectum or vagina into the urethra. Female anatomy makes it easy for bacteria from the bowel to enter a woman s urinary tract, which is the most common source of UTI. This means women develop UTIs more often than men. Pain in or around the urinary tract is a common UTI symptom. But the only way to know for sure if you have a UTI for the healthcare provider to test your urine. The two tests that may be done are the urinalysis and urine culture.     Types of UTIs    Cystitis. A bladder infection (cystitis) is the most common UTI in women. You may have urgent or frequent need to pee. You may also have pain, burning when you pee, and bloody  urine.    Urethritis. This is an inflamed urethra, which is the tube that carries urine from the bladder to outside the body. You may have lower stomach or back pain. You may also have urgent or frequent need to pee.    Pyelonephritis. This is a kidney infection. If not treated, it can be serious and damage your kidneys. In severe cases, you may need to stay in the hospital. You may have a fever and lower back pain.    Medicines to treat a UTI  Most UTIs are treated with antibiotics. These kill the bacteria. The length of time you need to take them depends on the type of infection. It may be as short as 3 days. If you have repeated UTIs, you may need a low-dose antibiotic for several months. Take antibiotics exactly as directed. Don t stop taking them until all of the medicine is gone. If you stop taking the antibiotic too soon, the infection may not go away. You may also develop a resistance to the antibiotic. This can make it much harder to treat.   Lifestyle changes to treat and prevent UTIs   The lifestyle changes below will help get rid of your UTI. They may also help prevent future UTIs.     Drink plenty of fluids. This includes water, juice, or other caffeine-free drinks. Fluids help flush bacteria out of your body.    Empty your bladder. Always empty your bladder when you feel the urge to pee. And always pee before going to sleep. Urine that stays in your bladder can lead to infection. Try to pee before and after sex as well.    Practice good personal hygiene. Wipe yourself from front to back after using the toilet. This helps keep bacteria from getting into the urethra.    Use condoms during sex. These help prevent UTIs caused by sexually transmitted bacteria. Also don't use spermicides during sex. These can increase the risk for UTIs. Choose other forms of birth control instead. For women who tend to get UTIs after sex, a low-dose of a preventive antibiotic may be used. Be sure to discuss this option with  your healthcare provider.    Follow up with your healthcare provider as directed. He or she may test to make sure the infection has cleared. If needed, more treatment may be started.  Chet last reviewed this educational content on 7/1/2019 2000-2021 [employer]. All rights reserved. This information is not intended as a substitute for professional medical care. Always follow your healthcare professional's instructions.

## 2022-04-12 ENCOUNTER — OFFICE VISIT (OUTPATIENT)
Dept: FAMILY MEDICINE | Facility: CLINIC | Age: 22
End: 2022-04-12

## 2022-04-12 VITALS
SYSTOLIC BLOOD PRESSURE: 132 MMHG | OXYGEN SATURATION: 98 % | BODY MASS INDEX: 25.98 KG/M2 | DIASTOLIC BLOOD PRESSURE: 72 MMHG | HEIGHT: 62 IN | WEIGHT: 141.2 LBS | HEART RATE: 69 BPM | TEMPERATURE: 98.1 F

## 2022-04-12 DIAGNOSIS — F31.81 BIPOLAR 2 DISORDER (H): ICD-10-CM

## 2022-04-12 DIAGNOSIS — G43.109 MIGRAINE WITH AURA AND WITHOUT STATUS MIGRAINOSUS, NOT INTRACTABLE: Primary | ICD-10-CM

## 2022-04-12 PROCEDURE — 99214 OFFICE O/P EST MOD 30 MIN: CPT | Performed by: FAMILY MEDICINE

## 2022-04-12 RX ORDER — TOPIRAMATE 25 MG/1
25 TABLET, FILM COATED ORAL DAILY
Qty: 60 TABLET | Refills: 0 | Status: SHIPPED | OUTPATIENT
Start: 2022-04-12 | End: 2022-04-21

## 2022-04-12 RX ORDER — ISOTRETINOIN 40 MG/1
40 CAPSULE ORAL DAILY
COMMUNITY
Start: 2021-07-01 | End: 2022-06-16

## 2022-04-12 ASSESSMENT — ANXIETY QUESTIONNAIRES
5. BEING SO RESTLESS THAT IT IS HARD TO SIT STILL: SEVERAL DAYS
3. WORRYING TOO MUCH ABOUT DIFFERENT THINGS: MORE THAN HALF THE DAYS
6. BECOMING EASILY ANNOYED OR IRRITABLE: MORE THAN HALF THE DAYS
GAD7 TOTAL SCORE: 11
1. FEELING NERVOUS, ANXIOUS, OR ON EDGE: MORE THAN HALF THE DAYS
7. FEELING AFRAID AS IF SOMETHING AWFUL MIGHT HAPPEN: NOT AT ALL
2. NOT BEING ABLE TO STOP OR CONTROL WORRYING: SEVERAL DAYS

## 2022-04-12 ASSESSMENT — PATIENT HEALTH QUESTIONNAIRE - PHQ9
5. POOR APPETITE OR OVEREATING: NEARLY EVERY DAY
SUM OF ALL RESPONSES TO PHQ QUESTIONS 1-9: 10

## 2022-04-12 NOTE — PROGRESS NOTES
"  A/P:      ICD-10-CM    1. Migraine with aura and without status migrainosus, not intractable  G43.109 MR Brain w/o & w Contrast     topiramate (TOPAMAX) 25 MG tablet   2. Bipolar 2 disorder (H)  F31.81      Migraine with aura and new neurologic symptoms of tinnitus:  Due to new symptoms would avoid triptan and refer for imaging.  Reviewed options for prophylactic medicine given frequency of HA pain.  Given pt is already on duloxetine will avoid additional serotonin agent.  Reviewed possible side effects of topiramate including potential to impact mood and need to avoid in pregnancy.  Pt verbalized understanding.    Bipolar:  Worsening of mood, likely bipolar 2 diagnosis.  Concern for precipitating manic episode by increasing dose of antidepressant without mood stabilizer.  Pt already ahs appointment later this month with her psychiatric provider.  Encouraged her to follow up with him regarding consideration for starting mood stabilizer.  She agrees            Subjective   Blanca is a 21 year old who presents for the following health issues     Mental Health Problem    History of Present Illness       Migraines:   Since the patient's last clinic visit, headaches are: worsened  The patient is getting headaches:  3x/week  She is not able to do normal daily activities when she has a migraine.  The patient is taking the following rescue/relief medications:  Ibuprofen (Advil, Motrin), Naproxyn (Aleve) and Tylenol   Patient states \"I get only a small amount of relief\" from the rescue/relief medications.   The patient is taking the following medications to prevent migraines:  No medications to prevent migraines  In the past 4 weeks, the patient has gone to an Urgent Care or Emergency Room 0 times times due to headaches.    Reason for visit:  Depression/bipolar  Symptom onset:  More than a month  Symptom intensity:  Moderate  Symptom progression:  Staying the same  Had these symptoms before:  Yes  Has tried/received " treatment for these symptoms:  Yes  Previous treatment was successful:  Yes  Prior treatment description:  Medication (cymbalta) and therapy  What makes it worse:  Being off of meds  What makes it better:  Meds and therapy and stress free    She eats 4 or more servings of fruits and vegetables daily.She consumes 0 sweetened beverage(s) daily.She exercises with enough effort to increase her heart rate 60 or more minutes per day.  She exercises with enough effort to increase her heart rate 5 days per week.   She is taking medications regularly.       *  Had been seeing psychiatry for management of her mood.  Has had periods of decreased sleep/erractic behavior prior to starting cymbalta.  No episodes or concerns for manic symptoms since starting the medication.      Felt like selective serotonin reuptake inhibitor did not work but duloxetine has been much more effective.    Had been seeing psychiatric provider who started duloxetine prior to her testing.  After testing was diagnosed with Bipolar disorder.  Was recommended to start lamictal but declined as she felt things were going well and she was concerned about adding another medication.       Would like to increase dose, just feels like mood is not as well controlled.  Most troublesome is getting enough sleep.  Working with therapist regularly as well.  Denies any thoughts of self harm or suicide    Has seen a PA through [facility].  Was hoping to transfer care here.  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  Migraine pain is a deep throbbing HA.  Gets twinkling lights in her vision and loses peripheral vision prior to onset of her HA.    Had a bad one 2 years ago, went to  and got toradol which worked well.  HA in general had been better, not frequent or sever but now worse since Jan.  Getting HA 2-3 times per week.  Has had to miss work due to HA.    Has not identified triggers.  No benefit by NSAID's so only  "takes them rarely    Pain in erick temples.  Has nausea, no vomiting.  Has tried zofran occasionally.  Photo and phono sensitive when HA is present.  Denies any numbness/tingling/weakness or change in balance or speech with HA.  Has developed a new symptoms of \"severe tinnitus\" in the L ear when HA is present.  This does not happen with every HA but is frequent and a new symptoms      Review of Systems   Constitutional, HEENT, cardiovascular, pulmonary, gi and gu systems are negative, except as otherwise noted.      Objective    /72   Pulse 69   Temp 98.1  F (36.7  C) (Tympanic)   Ht 1.581 m (5' 2.25\")   Wt 64 kg (141 lb 3.2 oz)   SpO2 98%   Breastfeeding No   BMI 25.62 kg/m    Body mass index is 25.62 kg/m .  Physical Exam   PE:  VS as above   Gen:  WN/WD/WH female in NAD   Heart:  RRR without murmur, nl S1, S2, no rubs or gallops   Lungs CTA erick without rales/ronchi/wheezes  Psych: Alert and oriented times 3; coherent speech, normal   rate and volume, able to articulate logical thoughts, able   to abstract reason, no tangential thoughts, no hallucinations   or delusions  Her affect is bright and appropriate      Epic reviewed            "

## 2022-04-12 NOTE — NURSING NOTE
"Initial /72   Pulse 69   Temp 98.1  F (36.7  C) (Tympanic)   Ht 1.581 m (5' 2.25\")   Wt 64 kg (141 lb 3.2 oz)   SpO2 98%   Breastfeeding No   BMI 25.62 kg/m   Estimated body mass index is 25.62 kg/m  as calculated from the following:    Height as of this encounter: 1.581 m (5' 2.25\").    Weight as of this encounter: 64 kg (141 lb 3.2 oz). .      "

## 2022-04-13 ASSESSMENT — ANXIETY QUESTIONNAIRES: GAD7 TOTAL SCORE: 11

## 2022-04-20 ENCOUNTER — MYC MEDICAL ADVICE (OUTPATIENT)
Dept: FAMILY MEDICINE | Facility: CLINIC | Age: 22
End: 2022-04-20
Payer: COMMERCIAL

## 2022-04-20 DIAGNOSIS — G43.109 MIGRAINE WITH AURA AND WITHOUT STATUS MIGRAINOSUS, NOT INTRACTABLE: ICD-10-CM

## 2022-04-21 RX ORDER — TOPIRAMATE 25 MG/1
50 TABLET, FILM COATED ORAL DAILY
Qty: 180 TABLET | Refills: 0 | Status: SHIPPED | OUTPATIENT
Start: 2022-04-21 | End: 2022-05-16

## 2022-04-30 ENCOUNTER — HEALTH MAINTENANCE LETTER (OUTPATIENT)
Age: 22
End: 2022-04-30

## 2022-05-14 ENCOUNTER — MYC MEDICAL ADVICE (OUTPATIENT)
Dept: FAMILY MEDICINE | Facility: CLINIC | Age: 22
End: 2022-05-14
Payer: COMMERCIAL

## 2022-05-14 DIAGNOSIS — G43.109 MIGRAINE WITH AURA AND WITHOUT STATUS MIGRAINOSUS, NOT INTRACTABLE: ICD-10-CM

## 2022-05-16 RX ORDER — TOPIRAMATE 25 MG/1
75 TABLET, FILM COATED ORAL DAILY
Qty: 90 TABLET | Refills: 0 | Status: SHIPPED | OUTPATIENT
Start: 2022-05-16 | End: 2022-06-17

## 2022-06-03 ENCOUNTER — MYC MEDICAL ADVICE (OUTPATIENT)
Dept: FAMILY MEDICINE | Facility: CLINIC | Age: 22
End: 2022-06-03
Payer: COMMERCIAL

## 2022-06-03 DIAGNOSIS — Z01.84 IMMUNITY STATUS TESTING: Primary | ICD-10-CM

## 2022-06-08 NOTE — PROGRESS NOTES
"Patient is here today for a Mantoux (TST) test placement.    Is there a current order in the chart? No. Placed order according to standing order (reference the \"Skin Test- Tuberculosis Screening- Ambulatory Care\" standing order in Policy Tech). Review the Inclusion and Exclusion Criteria.    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 Patient to return for second test in 1-3 weeks after first test is read.     Exclusion Criteria  None - Place order for Mantoux (TST) test per standing order.    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 No - form not needed per patient.    Instructed patient to wait for 15 minutes post injection and to report any reactions immediately to staff.    Told patient to return to clinic in 48-72 hours to have Mantoux (TST) read.           Prior to immunization administration, verified patients identity using patient s name and date of birth. Please see Immunization Activity for additional information.     Screening Questionnaire for Adult Immunization    Are you sick today?   No   Do you have allergies to medications, food, a vaccine component or latex?   No   Have you ever had a serious reaction after receiving a vaccination?   No   Do you have a long-term health problem with heart, lung, kidney, or metabolic disease (e.g., diabetes), asthma, a blood disorder, no spleen, complement component deficiency, a cochlear implant, or a spinal fluid leak?  Are you on long-term aspirin therapy?   No   Do you have cancer, leukemia, HIV/AIDS, or any other immune system problem?   No   Do you have a parent, brother, or sister with an immune system problem?   No   In the past 3 months, have you taken medications that affect  your immune system, such as prednisone, other steroids, or anticancer drugs; drugs for the treatment of rheumatoid arthritis, " Crohn s disease, or psoriasis; or have you had radiation treatments?   No   Have you had a seizure, or a brain or other nervous system problem?   No   During the past year, have you received a transfusion of blood or blood    products, or been given immune (gamma) globulin or antiviral drug?   No   For women: Are you pregnant or is there a chance you could become       pregnant during the next month?   No   Have you received any vaccinations in the past 4 weeks?   No     Immunization questionnaire answers were all negative.        Injection of Tdap given by Jazmyne Gerard CMA. Patient instructed to remain in clinic for 15 minutes afterwards, and to report any adverse reaction to me immediately.       Screening performed by Jazmyne Gerard CMA on 6/10/2022 at 10:00 AM

## 2022-06-10 ENCOUNTER — ALLIED HEALTH/NURSE VISIT (OUTPATIENT)
Dept: FAMILY MEDICINE | Facility: CLINIC | Age: 22
End: 2022-06-10
Payer: COMMERCIAL

## 2022-06-10 ENCOUNTER — LAB (OUTPATIENT)
Dept: LAB | Facility: CLINIC | Age: 22
End: 2022-06-10
Payer: COMMERCIAL

## 2022-06-10 DIAGNOSIS — Z11.1 TUBERCULOSIS SCREENING: Primary | ICD-10-CM

## 2022-06-10 DIAGNOSIS — Z01.84 IMMUNITY STATUS TESTING: ICD-10-CM

## 2022-06-10 DIAGNOSIS — Z23 NEED FOR VACCINATION: ICD-10-CM

## 2022-06-10 PROCEDURE — 90471 IMMUNIZATION ADMIN: CPT

## 2022-06-10 PROCEDURE — 99207 PR NO CHARGE NURSE ONLY: CPT

## 2022-06-10 PROCEDURE — 86704 HEP B CORE ANTIBODY TOTAL: CPT

## 2022-06-10 PROCEDURE — 87340 HEPATITIS B SURFACE AG IA: CPT

## 2022-06-10 PROCEDURE — 36415 COLL VENOUS BLD VENIPUNCTURE: CPT

## 2022-06-10 PROCEDURE — 86580 TB INTRADERMAL TEST: CPT

## 2022-06-10 PROCEDURE — 90715 TDAP VACCINE 7 YRS/> IM: CPT

## 2022-06-10 PROCEDURE — 86706 HEP B SURFACE ANTIBODY: CPT

## 2022-06-13 ENCOUNTER — TELEPHONE (OUTPATIENT)
Dept: FAMILY MEDICINE | Facility: CLINIC | Age: 22
End: 2022-06-13

## 2022-06-13 ENCOUNTER — ALLIED HEALTH/NURSE VISIT (OUTPATIENT)
Dept: NURSING | Facility: CLINIC | Age: 22
End: 2022-06-13
Payer: COMMERCIAL

## 2022-06-13 DIAGNOSIS — Z11.1 VISIT FOR MANTOUX TEST: Primary | ICD-10-CM

## 2022-06-13 DIAGNOSIS — Z01.84 IMMUNITY STATUS TESTING: Primary | ICD-10-CM

## 2022-06-13 LAB
HBV SURFACE AB SERPL IA-ACNC: 2.8 M[IU]/ML
PPDINDURATION: 0 MM (ref 0–4.99)
PPDREDNESS: NORMAL

## 2022-06-13 PROCEDURE — 99207 PR NO CHARGE NURSE ONLY: CPT

## 2022-06-13 NOTE — TELEPHONE ENCOUNTER
Reason for Call: Request for an order or referral:    Order or referral being requested: Patient calling and her work wants her to redo a hepatitis B vaccination Titer.  I don't see an order in the system so wanted to request so if patient who scheduled for this doesn't go to appointment with out something in the system in case needed     Date needed: ASAP    Has the patient been seen by the PCP for this problem?No  Additional comments: She has been seen recently for other vaccinations, but this one, her work is requesting for her to redo.     Phone number Patient can be reached at:  [phone]  Best Time:  Yes     Can we leave a detailed message on this number? Yes   Call taken on 6/13/2022 at 5:14 PM by Danica Alcazar

## 2022-06-13 NOTE — LETTER
[facility]  [id number]  [place] [address]  [phone]          6/13/2022          To Whom it May Concern:     Blanca Andrew, female, 2000 has had a mantoux on 6/10/22.      Mantoux result is NEGATIVE:  Lab Results   Component Value Date    PPDREDNESS Not Present 06/13/2022    PPDINDURATIO 0 06/13/2022         Please contact me for questions or concerns.        Sincerely,

## 2022-06-13 NOTE — TELEPHONE ENCOUNTER
Reason for Call:  Other appointment    Detailed comments:   needs Hep vaccination    Phone Number Patient can be reached at: Cell number on file:    Telephone Information:   Mobile [phone]       Best Time: as soon as possible    Can we leave a detailed message on this number? YES    Call taken on 6/13/2022 at 4:57 PM by Ana Powers

## 2022-06-13 NOTE — PROGRESS NOTES
Patient is here today for a Mantoux (TST) test results.    Did patient return to clinic 48-72 hours from Mantoux (TST) placement:   Yes -     PPD Induration   Date Value Ref Range Status   06/13/2022 0 0 - 4.99 mm Final     PPD Redness   Date Value Ref Range Status   06/13/2022 Not Present  Final           Induration Size? Induration <5mm - Enter results in Enter/Edit Activity. Route results to ordering provider.     Patient needs form signed? No    Patient reports having previously had the BCG Vaccine: No    Does patient need a two step? No     Syeda Watson RN

## 2022-06-14 LAB
HBV CORE AB SERPL QL IA: NONREACTIVE
HBV SURFACE AG SERPL QL IA: NONREACTIVE

## 2022-06-14 NOTE — TELEPHONE ENCOUNTER
Spoke with patient. She has appointment. Need to repeat hep b vaccine as titers show she is not immune. Chen Riley MA

## 2022-06-14 NOTE — TELEPHONE ENCOUNTER
She is going to Medical School and she is being asked to re-do her titer because the Hepatitis B Surface Antibody was negative on 6/10/22.  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   If it does not; they are asking her to ne re-immunized and re-tested.  Shailesh Ford, RN

## 2022-06-14 NOTE — TELEPHONE ENCOUNTER
Message left on patient's answering machine to call the [facility] RN back.  Shailesh Ford RN

## 2022-06-14 NOTE — TELEPHONE ENCOUNTER
"Do we know why they are requesting a \"re do\"   Is there a specific test they are looking for? I added a core antibody and surface antigen to the lab she had collected on the 10th so maybe that additional information will be enough?     Janis Vickers PA-C    "

## 2022-06-15 NOTE — TELEPHONE ENCOUNTER
I have been my charting the patient. She will be scheduling an appt to complete the series again  Janis   3

## 2022-06-22 ASSESSMENT — PATIENT HEALTH QUESTIONNAIRE - PHQ9
SUM OF ALL RESPONSES TO PHQ QUESTIONS 1-9: 4
10. IF YOU CHECKED OFF ANY PROBLEMS, HOW DIFFICULT HAVE THESE PROBLEMS MADE IT FOR YOU TO DO YOUR WORK, TAKE CARE OF THINGS AT HOME, OR GET ALONG WITH OTHER PEOPLE: SOMEWHAT DIFFICULT
SUM OF ALL RESPONSES TO PHQ QUESTIONS 1-9: 4

## 2022-06-27 ENCOUNTER — ALLIED HEALTH/NURSE VISIT (OUTPATIENT)
Dept: FAMILY MEDICINE | Facility: CLINIC | Age: 22
End: 2022-06-27
Payer: COMMERCIAL

## 2022-06-27 DIAGNOSIS — Z23 NEED FOR VACCINATION: Primary | ICD-10-CM

## 2022-06-27 PROCEDURE — 99207 PR NO CHARGE NURSE ONLY: CPT

## 2022-06-27 PROCEDURE — 90471 IMMUNIZATION ADMIN: CPT

## 2022-06-27 PROCEDURE — 90746 HEPB VACCINE 3 DOSE ADULT IM: CPT

## 2022-06-27 NOTE — NURSING NOTE
Prior to immunization administration, verified patients identity using patient s name and date of birth. Please see Immunization Activity for additional information.     Screening Questionnaire for Adult Immunization    Are you sick today?   No   Do you have allergies to medications, food, a vaccine component or latex?   Yes   Have you ever had a serious reaction after receiving a vaccination?   No   Do you have a long-term health problem with heart, lung, kidney, or metabolic disease (e.g., diabetes), asthma, a blood disorder, no spleen, complement component deficiency, a cochlear implant, or a spinal fluid leak?  Are you on long-term aspirin therapy?   No   Do you have cancer, leukemia, HIV/AIDS, or any other immune system problem?   No   Do you have a parent, brother, or sister with an immune system problem?   No   In the past 3 months, have you taken medications that affect  your immune system, such as prednisone, other steroids, or anticancer drugs; drugs for the treatment of rheumatoid arthritis, Crohn s disease, or psoriasis; or have you had radiation treatments?   No   Have you had a seizure, or a brain or other nervous system problem?   No   During the past year, have you received a transfusion of blood or blood    products, or been given immune (gamma) globulin or antiviral drug?   No   For women: Are you pregnant or is there a chance you could become       pregnant during the next month?   No   Have you received any vaccinations in the past 4 weeks?   No     Immunization questionnaire answers were all negative.        Per orders of Janis Vickers , injection of Hep B given by Emily Simms. Patient instructed to remain in clinic for 15 minutes afterwards, and to report any adverse reaction to me immediately.       Screening performed by Emily Simms on 6/27/2022 at 9:16 AM.

## 2022-06-28 ENCOUNTER — VIRTUAL VISIT (OUTPATIENT)
Dept: FAMILY MEDICINE | Facility: CLINIC | Age: 22
End: 2022-06-28
Payer: COMMERCIAL

## 2022-06-28 DIAGNOSIS — G43.109 MIGRAINE WITH AURA AND WITHOUT STATUS MIGRAINOSUS, NOT INTRACTABLE: ICD-10-CM

## 2022-06-28 PROCEDURE — 99213 OFFICE O/P EST LOW 20 MIN: CPT | Mod: 95 | Performed by: FAMILY MEDICINE

## 2022-06-28 RX ORDER — TOPIRAMATE 25 MG/1
75 TABLET, FILM COATED ORAL DAILY
Qty: 270 TABLET | Refills: 3 | Status: SHIPPED | OUTPATIENT
Start: 2022-06-28 | End: 2022-09-26

## 2022-06-28 RX ORDER — QUETIAPINE FUMARATE 50 MG/1
50 TABLET, FILM COATED ORAL AT BEDTIME
COMMUNITY
Start: 2022-06-13 | End: 2022-12-23

## 2022-06-28 RX ORDER — SUMATRIPTAN 25 MG/1
25 TABLET, FILM COATED ORAL
Qty: 9 TABLET | Refills: 2 | Status: SHIPPED | OUTPATIENT
Start: 2022-06-28

## 2022-06-28 RX ORDER — DULOXETIN HYDROCHLORIDE 20 MG/1
20 CAPSULE, DELAYED RELEASE ORAL DAILY
COMMUNITY
End: 2022-12-23

## 2022-06-28 ASSESSMENT — PATIENT HEALTH QUESTIONNAIRE - PHQ9
SUM OF ALL RESPONSES TO PHQ QUESTIONS 1-9: 4
10. IF YOU CHECKED OFF ANY PROBLEMS, HOW DIFFICULT HAVE THESE PROBLEMS MADE IT FOR YOU TO DO YOUR WORK, TAKE CARE OF THINGS AT HOME, OR GET ALONG WITH OTHER PEOPLE: SOMEWHAT DIFFICULT

## 2022-06-28 ASSESSMENT — ENCOUNTER SYMPTOMS: HEADACHES: 1

## 2022-06-28 NOTE — PROGRESS NOTES
"Blanca is a 21 year old who is being evaluated via a billable video visit.      How would you like to obtain your AVS? MyChart  If the video visit is dropped, the invitation should be resent by: Text to cell phone: [phone]  Will anyone else be joining your video visit? No        A/P:      ICD-10-CM    1. Migraine with aura and without status migrainosus, not intractable  G43.109 topiramate (TOPAMAX) 25 MG tablet     SUMAtriptan (IMITREX) 25 MG tablet     Initially was concerned with new tinnitus with HA.  Pt could not get coverage for MRI and was unable to schedule with neurology prior to her leaving for school next week.  No symptoms with HA concerning for hemiplegic migraine.  Tinnitus has improved and was never just with HA.  HA frequency and intensity has improved as well.  Plan cautious trial of imitrex and reviewed reasons to seek emergent care.    Continue topirimate at current dosing.    F/u in the fall when home from school    Subjective   Blanca is a 21 year old presenting for the following health issues:  Headache      Headache     History of Present Illness       Headaches:   Since the patient's last clinic visit, headaches are: improved  The patient is getting headaches:  Usually now 3x/month sometimes more, but much less than prior  She is not able to do normal daily activities when she has a migraine.  The patient is taking the following rescue/relief medications:  Ibuprofen (Advil, Motrin), Naproxyn (Aleve), Tylenol and Excedrin   Patient states \"I get no relief\" from the rescue/relief medications.   The patient is taking the following medications to prevent migraines:  Topomax  In the past 4 weeks, the patient has gone to an Urgent Care or Emergency Room 0 times times due to headaches.    She eats 4 or more servings of fruits and vegetables daily.She consumes 0 sweetened beverage(s) daily.She exercises with enough effort to increase her heart rate 60 or more minutes per day.  She exercises with " enough effort to increase her heart rate 6 days per week.   She is taking medications regularly.    Today's PHQ-9         PHQ-9 Total Score: 4    PHQ-9 Q9 Thoughts of better off dead/self-harm past 2 weeks :   Several days  Thoughts of suicide or self harm: (P) No  Self-harm Plan:     Self-harm Action:       Safety concerns for self or others: (P) No    How difficult have these problems made it for you to do your work, take care of things at home, or get along with other people: Somewhat difficult     ACS for psychiatry.  Only doing med management.  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  Will be moving to [place] in 2 days.        Review of Systems   Neurological: Positive for headaches.      HA much improved.  Only 3 or fewer per month.  Gets releif with ice pack and can lay down for an hour or so.  over the counter meds still not helpful.  Feels intensity of HA is much improved.  Noted dramatic improvement in tinnitus as well feels like thi is no longer an issue with her headaches.  Feels she gets ringing on occasion but not related to HA any longer.  No numbness/tingling/weakness, speech or balance difficulties noted with HA.    Worries things might get worse at school with increased stress.  Would like to try an abortive to have on hand.        Objective           Vitals:  No vitals were obtained today due to virtual visit.    Physical Exam   GENERAL: Healthy, alert and no distress  EYES: Eyes grossly normal to inspection.  No discharge or erythema, or obvious scleral/conjunctival abnormalities.  RESP: No audible wheeze, cough, or visible cyanosis.  No visible retractions or increased work of breathing.    SKIN: Visible skin clear. No significant rash, abnormal pigmentation or lesions.  NEURO: Cranial nerves grossly intact.  Mentation and speech appropriate for age.  PSYCH: Mentation appears normal, affect normal/bright, judgement and insight intact, normal speech and appearance well-groomed.    Epic reviewed             Video-Visit Details    Video Start Time: 1:14 PM      Type of service:  Video Visit    Video End Time: 1:29 PM      Originating Location (pt. Location): Home    Distant Location (provider location):  [facility]     Platform used for Video Visit: Sherif Rosen

## 2022-11-20 ENCOUNTER — HEALTH MAINTENANCE LETTER (OUTPATIENT)
Age: 22
End: 2022-11-20

## 2022-12-14 ENCOUNTER — MYC MEDICAL ADVICE (OUTPATIENT)
Dept: FAMILY MEDICINE | Facility: CLINIC | Age: 22
End: 2022-12-14

## 2022-12-16 PROBLEM — F31.81 BIPOLAR 2 DISORDER (H): Status: ACTIVE | Noted: 2022-12-16

## 2022-12-22 ASSESSMENT — ENCOUNTER SYMPTOMS
ARTHRALGIAS: 0
CHILLS: 0
WEAKNESS: 0
DIZZINESS: 0
HEMATOCHEZIA: 0
MYALGIAS: 0
NAUSEA: 1
BREAST MASS: 0
SHORTNESS OF BREATH: 0
DIARRHEA: 0
COUGH: 0
HEARTBURN: 0
FEVER: 0
HEMATURIA: 0
ABDOMINAL PAIN: 0
CONSTIPATION: 0
DYSURIA: 0
EYE PAIN: 0
PALPITATIONS: 0
PARESTHESIAS: 0
HEADACHES: 1
FREQUENCY: 0
NERVOUS/ANXIOUS: 0
JOINT SWELLING: 0
SORE THROAT: 0

## 2022-12-22 ASSESSMENT — PATIENT HEALTH QUESTIONNAIRE - PHQ9
SUM OF ALL RESPONSES TO PHQ QUESTIONS 1-9: 14
SUM OF ALL RESPONSES TO PHQ QUESTIONS 1-9: 14
10. IF YOU CHECKED OFF ANY PROBLEMS, HOW DIFFICULT HAVE THESE PROBLEMS MADE IT FOR YOU TO DO YOUR WORK, TAKE CARE OF THINGS AT HOME, OR GET ALONG WITH OTHER PEOPLE: VERY DIFFICULT

## 2022-12-23 ENCOUNTER — OFFICE VISIT (OUTPATIENT)
Dept: FAMILY MEDICINE | Facility: CLINIC | Age: 22
End: 2022-12-23
Payer: COMMERCIAL

## 2022-12-23 VITALS
BODY MASS INDEX: 24.7 KG/M2 | WEIGHT: 134.25 LBS | DIASTOLIC BLOOD PRESSURE: 62 MMHG | HEIGHT: 62 IN | HEART RATE: 60 BPM | OXYGEN SATURATION: 99 % | TEMPERATURE: 97.3 F | RESPIRATION RATE: 12 BRPM | SYSTOLIC BLOOD PRESSURE: 102 MMHG

## 2022-12-23 DIAGNOSIS — Z00.00 HEALTHCARE MAINTENANCE: Primary | ICD-10-CM

## 2022-12-23 DIAGNOSIS — Z12.4 CERVICAL CANCER SCREENING: ICD-10-CM

## 2022-12-23 DIAGNOSIS — Z11.3 SCREENING FOR STDS (SEXUALLY TRANSMITTED DISEASES): ICD-10-CM

## 2022-12-23 PROCEDURE — 87591 N.GONORRHOEAE DNA AMP PROB: CPT | Performed by: FAMILY MEDICINE

## 2022-12-23 PROCEDURE — 99395 PREV VISIT EST AGE 18-39: CPT | Mod: 25 | Performed by: FAMILY MEDICINE

## 2022-12-23 PROCEDURE — 87491 CHLMYD TRACH DNA AMP PROBE: CPT | Performed by: FAMILY MEDICINE

## 2022-12-23 PROCEDURE — 90472 IMMUNIZATION ADMIN EACH ADD: CPT | Performed by: FAMILY MEDICINE

## 2022-12-23 PROCEDURE — 90471 IMMUNIZATION ADMIN: CPT | Performed by: FAMILY MEDICINE

## 2022-12-23 PROCEDURE — 90746 HEPB VACCINE 3 DOSE ADULT IM: CPT | Performed by: FAMILY MEDICINE

## 2022-12-23 PROCEDURE — 90651 9VHPV VACCINE 2/3 DOSE IM: CPT | Performed by: FAMILY MEDICINE

## 2022-12-23 PROCEDURE — G0145 SCR C/V CYTO,THINLAYER,RESCR: HCPCS | Performed by: FAMILY MEDICINE

## 2022-12-23 RX ORDER — DICYCLOMINE HCL 20 MG
TABLET ORAL
COMMUNITY
Start: 2022-10-30

## 2022-12-23 RX ORDER — LURASIDONE HYDROCHLORIDE 40 MG/1
40 TABLET, FILM COATED ORAL
COMMUNITY
Start: 2022-12-15

## 2022-12-23 RX ORDER — ONDANSETRON 4 MG/1
4 TABLET, FILM COATED ORAL
COMMUNITY
Start: 2022-10-30

## 2022-12-23 NOTE — PROGRESS NOTES
Answers for HPI/ROS submitted by the patient on 12/22/2022  If you checked off any problems, how difficult have these problems made it for you to do your work, take care of things at home, or get along with other people?: Very difficult  PHQ9 TOTAL SCORE: 14  Frequency of exercise:: 6-7 days/week  Getting at least 3 servings of Calcium per day:: Yes  Diet:: Regular (no restrictions)  Taking medications regularly:: Yes  Medication side effects:: Other  Bi-annual eye exam:: NO  Dental care twice a year:: Yes  Sleep apnea or symptoms of sleep apnea:: None  abdominal pain: No  Blood in stool: No  Blood in urine: No  chest pain: No  chills: No  congestion: No  constipation: No  cough: No  diarrhea: No  dizziness: No  ear pain: No  eye pain: No  nervous/anxious: No  fever: No  frequency: No  genital sores: No  headaches: Yes  hearing loss: No  heartburn: No  arthralgias: No  joint swelling: No  peripheral edema: No  mood changes: Yes  myalgias: No  nausea: Yes  dysuria: No  palpitations: No  Skin sensation changes: No  sore throat: No  urgency: No  rash: No  shortness of breath: No  visual disturbance: No  weakness: No  pelvic pain: No  vaginal bleeding: No  vaginal discharge: No  tenderness: No  breast mass: No  breast discharge: No  Additional concerns today:: No  Duration of exercise:: 45-60 minutes        Assessment/Plan:     Health maintenance female exam.  All questions answered.  Await pap smear results.  Breast self exam technique reviewed and patient encouraged to perform self-exam monthly.  Discussed healthy lifestyle modifications.      Healthcare maintenance  Doing well.  Continue to follow with psychiatry.    IUD strings not seen on examination today.  Patient is not having menstrual cycles thus I would feel reassured that it is still in place.  Offered ultrasound but patient declines at this time which I think is reasonable.    Screening for STDs (sexually transmitted diseases)  - NEISSERIA GONORRHOEA PCR  -  CHLAMYDIA TRACHOMATIS PCR    Cervical cancer screening  - Pap Screen only - recommended age 21 - 24 years        Patient has been advised of split billing requirements and indicates understanding: Yes      Subjective:     Blanca Andrew is a 22 year old female who presents for an annual exam.  She is overall doing well.    Over the last few months she had unfortunately had some fairly severe epigastric abdominal pain and was diagnosed with a gastric ulcer.  She did not have a scope but is going to be following up with GI.  She is taking antacids and doing very well.    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  She is enjoying it although it is difficult.    History of bipolar disorder and recently switched from Seroquel to Latuda which is causing much less fatigue for her.    Healthy Habits:   Regular Exercise: Yes  Sunscreen Use: Yes  Healthy Diet: yes  Dental Visits Regularly: yes  Seat Belt: Yes  Self Breast Exam Monthly: yes  Prevention of Osteoporosis: yes    Immunization History   Administered Date(s) Administered     COVID-19 Vaccine 12+ (Pfizer) 03/09/2021, 04/02/2021, 11/16/2021     DTAP (<7y) 1998, 01/15/1999, 04/14/1999, 02/09/2000, 05/09/2003     Flu, Unspecified 10/07/2021, 10/07/2022     HEPA 06/10/2010, 08/08/2014     HPV9 12/23/2022     HepB 1998, 01/15/1999, 04/14/1999     HepB-Adult 06/27/2022, 07/28/2022, 12/23/2022     Hib (PRP-T) 1998, 01/15/1999, 04/14/1999     Influenza (IIV3) PF 11/24/2010, 11/03/2011     Influenza Vaccine >6 months (Alfuria,Fluzone) 10/25/2017, 10/31/2019, 10/28/2020     MMR 02/09/2000, 05/09/2003     Mantoux Tuberculin Skin Test 06/10/2022     Meningococcal ACWY (Menactra ) 06/10/2010, 08/04/2016     OPV, trivalent, live 04/14/1999     Poliovirus, inactivated (IPV) 1998, 01/15/1999, 04/19/1999, 02/09/2000     Rotavirus, pentavalent 01/15/1999, 02/05/1999, 04/14/1999     TDAP Vaccine (Adacel) 06/10/2010     Tdap (Adacel,Boostrix)  06/10/2022     Varicella 05/09/2003, 06/10/2010         Gynecologic History  No LMP recorded (lmp unknown). (Menstrual status: IUD).  Contraception: IUD Mirena  Last Pap: 2020. Results were: normal      OB History   No obstetric history on file.       Current Outpatient Medications   Medication Sig Dispense Refill     cyanocobalamin (VITAMIN B-12) 1000 MCG tablet Take 1,000 mcg by mouth daily       dicyclomine (BENTYL) 20 MG tablet        LATUDA 40 MG TABS tablet Take 40 mg by mouth daily with food       levonorgestrel (MIRENA) 20 MCG/24HR IUD 1 each (20 mcg) by Intrauterine route once       MULTIVITAMINS/FL PO Take by mouth daily        ondansetron (ZOFRAN) 4 MG tablet Take 4 mg by mouth       SUMAtriptan (IMITREX) 25 MG tablet Take 1 tablet (25 mg) by mouth at onset of headache for migraine May repeat in 2 hours. Max 8 tablets/24 hours. 9 tablet 2     Vitamin D3 (CHOLECALCIFEROL) 25 mcg (1000 units) tablet Take 50 mcg by mouth daily       DULoxetine (CYMBALTA) 20 MG capsule Take 20 mg by mouth daily       topiramate (TOPAMAX) 25 MG tablet Take 3 tablets (75 mg) by mouth daily for 90 days 270 tablet 3     No past medical history on file.  Past Surgical History:   Procedure Laterality Date     NO HISTORY OF SURGERY       Eddy  Family History   Problem Relation Age of Onset     Asthma Mother      Diabetes Father      Hypertension Father      Hyperlipidemia Father      Social History     Socioeconomic History     Marital status: Single     Spouse name: Not on file     Number of children: Not on file     Years of education: Not on file     Highest education level: Not on file   Occupational History     Not on file   Tobacco Use     Smoking status: Never     Smokeless tobacco: Never   Substance and Sexual Activity     Alcohol use: No     Alcohol/week: 0.0 standard drinks     Drug use: No     Sexual activity: Yes     Partners: Male     Birth control/protection: Condom   Other Topics Concern     Parent/sibling w/  "CABG, MI or angioplasty before 65F 55M? No   Social History Narrative     Not on file     Social Determinants of Health     Financial Resource Strain: Not on file   Food Insecurity: Not on file   Transportation Needs: Not on file   Physical Activity: Not on file   Stress: Not on file   Social Connections: Not on file   Intimate Partner Violence: Not on file   Housing Stability: Not on file       Review of Systems  12 point review of systems was completed and found to be negative except for what is been stated above.      Objective:      Vitals:    12/23/22 0735   BP: 102/62   Pulse: 60   Resp: 12   Temp: 97.3  F (36.3  C)   TempSrc: Tympanic   SpO2: 99%   Weight: 60.9 kg (134 lb 4 oz)   Height: 1.58 m (5' 2.21\")         Physical Exam:  General Appearance: Alert, cooperative, no distress, appears stated age   Head: Normocephalic, without obvious abnormality, atraumatic  Eyes: PERRL, conjunctiva/corneas clear, EOM's intact   Ears: Normal TM's and external ear canals, both ears  Neck: Supple, symmetrical, trachea midline, no adenopathy;  thyroid: not enlarged, symmetric, no tenderness/mass/nodules  Back: Symmetric, no curvature, ROM normal,  Lungs: Clear to auscultation bilaterally, respirations unlabored  Breasts: No breast masses, tenderness, asymmetry, or nipple discharge.  Heart: Regular rate and rhythm, S1 and S2 normal, no murmur, rub, or gallop  Abdomen: Soft, non-tender, bowel sounds active all four quadrants,  no masses, no organomegaly  Pelvic:normal external female genitalia, normal appearing vaginal mucosa and cervix, IUD strings not seen  Extremities: Extremities normal, atraumatic, no cyanosis or edema  Skin: Skin color, texture, turgor normal, no rashes or lesions  Lymph nodes: Cervical, supraclavicular, and axillary nodes normal and   Neurologic: Normal     "

## 2022-12-24 LAB
C TRACH DNA SPEC QL NAA+PROBE: NEGATIVE
N GONORRHOEA DNA SPEC QL NAA+PROBE: NEGATIVE

## 2022-12-28 LAB
BKR LAB AP GYN ADEQUACY: NORMAL
BKR LAB AP GYN INTERPRETATION: NORMAL
BKR LAB AP HPV REFLEX: NO
BKR LAB AP PREVIOUS ABNORMAL: NORMAL
PATH REPORT.COMMENTS IMP SPEC: NORMAL
PATH REPORT.COMMENTS IMP SPEC: NORMAL
PATH REPORT.RELEVANT HX SPEC: NORMAL

## 2023-01-09 ENCOUNTER — MYC MEDICAL ADVICE (OUTPATIENT)
Dept: FAMILY MEDICINE | Facility: CLINIC | Age: 23
End: 2023-01-09

## 2023-01-10 NOTE — TELEPHONE ENCOUNTER
Did you fax Janis's order for Hep B surface antibody from June?      Please email to her as well.    Thanks    Evangelina Alvarado, DO

## 2024-08-31 ENCOUNTER — HEALTH MAINTENANCE LETTER (OUTPATIENT)
Age: 24
End: 2024-08-31

## 2025-08-11 ENCOUNTER — PATIENT OUTREACH (OUTPATIENT)
Dept: CARE COORDINATION | Facility: CLINIC | Age: 25
End: 2025-08-11
Payer: COMMERCIAL